# Patient Record
Sex: MALE | Race: OTHER | HISPANIC OR LATINO
[De-identification: names, ages, dates, MRNs, and addresses within clinical notes are randomized per-mention and may not be internally consistent; named-entity substitution may affect disease eponyms.]

---

## 2019-02-08 ENCOUNTER — APPOINTMENT (OUTPATIENT)
Dept: OPHTHALMOLOGY | Facility: CLINIC | Age: 64
End: 2019-02-08
Payer: MEDICAID

## 2019-02-08 PROCEDURE — 92015 DETERMINE REFRACTIVE STATE: CPT

## 2019-02-08 PROCEDURE — 92004 COMPRE OPH EXAM NEW PT 1/>: CPT

## 2019-03-20 ENCOUNTER — APPOINTMENT (OUTPATIENT)
Dept: GASTROENTEROLOGY | Facility: CLINIC | Age: 64
End: 2019-03-20
Payer: MEDICAID

## 2019-03-20 VITALS
BODY MASS INDEX: 27.16 KG/M2 | HEIGHT: 65 IN | TEMPERATURE: 97.9 F | DIASTOLIC BLOOD PRESSURE: 83 MMHG | HEART RATE: 90 BPM | OXYGEN SATURATION: 97 % | WEIGHT: 163 LBS | SYSTOLIC BLOOD PRESSURE: 138 MMHG

## 2019-03-20 PROCEDURE — 99213 OFFICE O/P EST LOW 20 MIN: CPT

## 2019-03-27 ENCOUNTER — APPOINTMENT (OUTPATIENT)
Dept: ULTRASOUND IMAGING | Facility: HOSPITAL | Age: 64
End: 2019-03-27
Payer: MEDICAID

## 2019-03-27 ENCOUNTER — OUTPATIENT (OUTPATIENT)
Dept: OUTPATIENT SERVICES | Facility: HOSPITAL | Age: 64
LOS: 1 days | End: 2019-03-27
Payer: MEDICAID

## 2019-03-27 DIAGNOSIS — K21.9 GASTRO-ESOPHAGEAL REFLUX DISEASE WITHOUT ESOPHAGITIS: ICD-10-CM

## 2019-03-27 PROCEDURE — 76700 US EXAM ABDOM COMPLETE: CPT | Mod: 26

## 2019-03-27 PROCEDURE — 76700 US EXAM ABDOM COMPLETE: CPT

## 2019-05-15 ENCOUNTER — APPOINTMENT (OUTPATIENT)
Dept: OPHTHALMOLOGY | Facility: CLINIC | Age: 64
End: 2019-05-15

## 2020-04-02 ENCOUNTER — INPATIENT (INPATIENT)
Facility: HOSPITAL | Age: 65
LOS: 24 days | Discharge: HOME | DRG: 871 | End: 2020-04-27
Attending: INTERNAL MEDICINE | Admitting: HOSPITALIST
Payer: MEDICAID

## 2020-04-02 VITALS
WEIGHT: 160.06 LBS | DIASTOLIC BLOOD PRESSURE: 110 MMHG | TEMPERATURE: 100 F | SYSTOLIC BLOOD PRESSURE: 183 MMHG | HEIGHT: 65 IN | RESPIRATION RATE: 20 BRPM | OXYGEN SATURATION: 90 % | HEART RATE: 131 BPM

## 2020-04-02 DIAGNOSIS — R68.89 OTHER GENERAL SYMPTOMS AND SIGNS: ICD-10-CM

## 2020-04-02 DIAGNOSIS — Z90.49 ACQUIRED ABSENCE OF OTHER SPECIFIED PARTS OF DIGESTIVE TRACT: Chronic | ICD-10-CM

## 2020-04-02 DIAGNOSIS — Z98.890 OTHER SPECIFIED POSTPROCEDURAL STATES: Chronic | ICD-10-CM

## 2020-04-02 LAB
ALBUMIN SERPL ELPH-MCNC: 3.7 G/DL — SIGNIFICANT CHANGE UP (ref 3.3–5)
ALP SERPL-CCNC: 53 U/L — SIGNIFICANT CHANGE UP (ref 40–120)
ALT FLD-CCNC: 84 U/L — HIGH (ref 10–45)
ANION GAP SERPL CALC-SCNC: 14 MMOL/L — SIGNIFICANT CHANGE UP (ref 5–17)
APTT BLD: 29.4 SEC — SIGNIFICANT CHANGE UP (ref 27.5–36.3)
AST SERPL-CCNC: 84 U/L — HIGH (ref 10–40)
BASOPHILS # BLD AUTO: 0 K/UL — SIGNIFICANT CHANGE UP (ref 0–0.2)
BASOPHILS NFR BLD AUTO: 0 % — SIGNIFICANT CHANGE UP (ref 0–2)
BILIRUB SERPL-MCNC: 0.6 MG/DL — SIGNIFICANT CHANGE UP (ref 0.2–1.2)
BUN SERPL-MCNC: 13 MG/DL — SIGNIFICANT CHANGE UP (ref 7–23)
CALCIUM SERPL-MCNC: 8.8 MG/DL — SIGNIFICANT CHANGE UP (ref 8.4–10.5)
CHLORIDE SERPL-SCNC: 96 MMOL/L — SIGNIFICANT CHANGE UP (ref 96–108)
CO2 SERPL-SCNC: 27 MMOL/L — SIGNIFICANT CHANGE UP (ref 22–31)
CREAT SERPL-MCNC: 0.84 MG/DL — SIGNIFICANT CHANGE UP (ref 0.5–1.3)
CRP SERPL-MCNC: 3.08 MG/DL — HIGH (ref 0–0.4)
D DIMER BLD IA.RAPID-MCNC: 1148 NG/ML DDU — HIGH
EOSINOPHIL # BLD AUTO: 0 K/UL — SIGNIFICANT CHANGE UP (ref 0–0.5)
EOSINOPHIL NFR BLD AUTO: 0 % — SIGNIFICANT CHANGE UP (ref 0–6)
FERRITIN SERPL-MCNC: 2423 NG/ML — HIGH (ref 30–400)
GIANT PLATELETS BLD QL SMEAR: PRESENT — SIGNIFICANT CHANGE UP
GLUCOSE SERPL-MCNC: 208 MG/DL — HIGH (ref 70–99)
HCT VFR BLD CALC: 41.1 % — SIGNIFICANT CHANGE UP (ref 39–50)
HGB BLD-MCNC: 14.1 G/DL — SIGNIFICANT CHANGE UP (ref 13–17)
INR BLD: 1.2 RATIO — HIGH (ref 0.88–1.16)
LACTATE SERPL-SCNC: 1.4 MMOL/L — SIGNIFICANT CHANGE UP (ref 0.7–2)
LDH SERPL L TO P-CCNC: 491 U/L — HIGH (ref 50–242)
LYMPHOCYTES # BLD AUTO: 0.12 K/UL — LOW (ref 1–3.3)
LYMPHOCYTES # BLD AUTO: 2 % — LOW (ref 13–44)
MANUAL SMEAR VERIFICATION: SIGNIFICANT CHANGE UP
MCHC RBC-ENTMCNC: 30.6 PG — SIGNIFICANT CHANGE UP (ref 27–34)
MCHC RBC-ENTMCNC: 34.3 GM/DL — SIGNIFICANT CHANGE UP (ref 32–36)
MCV RBC AUTO: 89.2 FL — SIGNIFICANT CHANGE UP (ref 80–100)
MONOCYTES # BLD AUTO: 0.24 K/UL — SIGNIFICANT CHANGE UP (ref 0–0.9)
MONOCYTES NFR BLD AUTO: 4 % — SIGNIFICANT CHANGE UP (ref 2–14)
NEUTROPHILS # BLD AUTO: 5.39 K/UL — SIGNIFICANT CHANGE UP (ref 1.8–7.4)
NEUTROPHILS NFR BLD AUTO: 84 % — HIGH (ref 43–77)
NEUTS BAND # BLD: 7 % — SIGNIFICANT CHANGE UP (ref 0–8)
NRBC # BLD: 0 /100 — SIGNIFICANT CHANGE UP (ref 0–0)
PLAT MORPH BLD: ABNORMAL
PLATELET # BLD AUTO: 270 K/UL — SIGNIFICANT CHANGE UP (ref 150–400)
POTASSIUM SERPL-MCNC: 3.8 MMOL/L — SIGNIFICANT CHANGE UP (ref 3.5–5.3)
POTASSIUM SERPL-SCNC: 3.8 MMOL/L — SIGNIFICANT CHANGE UP (ref 3.5–5.3)
PROCALCITONIN SERPL-MCNC: 0.11 NG/ML — HIGH (ref 0.02–0.1)
PROT SERPL-MCNC: 7.8 G/DL — SIGNIFICANT CHANGE UP (ref 6–8.3)
PROTHROM AB SERPL-ACNC: 13.9 SEC — HIGH (ref 10–12.9)
RBC # BLD: 4.61 M/UL — SIGNIFICANT CHANGE UP (ref 4.2–5.8)
RBC # FLD: 11.9 % — SIGNIFICANT CHANGE UP (ref 10.3–14.5)
RBC BLD AUTO: NORMAL — SIGNIFICANT CHANGE UP
SODIUM SERPL-SCNC: 137 MMOL/L — SIGNIFICANT CHANGE UP (ref 135–145)
TROPONIN T, HIGH SENSITIVITY RESULT: 7 NG/L — SIGNIFICANT CHANGE UP (ref 0–51)
TROPONIN T, HIGH SENSITIVITY RESULT: 8 NG/L — SIGNIFICANT CHANGE UP (ref 0–51)
VARIANT LYMPHS # BLD: 3 % — SIGNIFICANT CHANGE UP (ref 0–6)
WBC # BLD: 5.92 K/UL — SIGNIFICANT CHANGE UP (ref 3.8–10.5)
WBC # FLD AUTO: 5.92 K/UL — SIGNIFICANT CHANGE UP (ref 3.8–10.5)

## 2020-04-02 PROCEDURE — 93010 ELECTROCARDIOGRAM REPORT: CPT

## 2020-04-02 PROCEDURE — 99285 EMERGENCY DEPT VISIT HI MDM: CPT

## 2020-04-02 PROCEDURE — 71045 X-RAY EXAM CHEST 1 VIEW: CPT | Mod: 26

## 2020-04-02 RX ORDER — HYDROXYCHLOROQUINE SULFATE 200 MG
200 TABLET ORAL EVERY 12 HOURS
Refills: 0 | Status: COMPLETED | OUTPATIENT
Start: 2020-04-03 | End: 2020-04-07

## 2020-04-02 RX ORDER — ACETAMINOPHEN 500 MG
650 TABLET ORAL EVERY 4 HOURS
Refills: 0 | Status: DISCONTINUED | OUTPATIENT
Start: 2020-04-02 | End: 2020-04-27

## 2020-04-02 RX ORDER — ACETAMINOPHEN 500 MG
975 TABLET ORAL ONCE
Refills: 0 | Status: COMPLETED | OUTPATIENT
Start: 2020-04-02 | End: 2020-04-02

## 2020-04-02 RX ORDER — ENOXAPARIN SODIUM 100 MG/ML
40 INJECTION SUBCUTANEOUS DAILY
Refills: 0 | Status: DISCONTINUED | OUTPATIENT
Start: 2020-04-02 | End: 2020-04-10

## 2020-04-02 RX ORDER — HYDROXYCHLOROQUINE SULFATE 200 MG
TABLET ORAL
Refills: 0 | Status: COMPLETED | OUTPATIENT
Start: 2020-04-02 | End: 2020-04-07

## 2020-04-02 RX ORDER — HYDROXYCHLOROQUINE SULFATE 200 MG
400 TABLET ORAL EVERY 12 HOURS
Refills: 0 | Status: COMPLETED | OUTPATIENT
Start: 2020-04-02 | End: 2020-04-03

## 2020-04-02 RX ORDER — CARVEDILOL PHOSPHATE 80 MG/1
25 CAPSULE, EXTENDED RELEASE ORAL EVERY 12 HOURS
Refills: 0 | Status: DISCONTINUED | OUTPATIENT
Start: 2020-04-02 | End: 2020-04-08

## 2020-04-02 RX ADMIN — CARVEDILOL PHOSPHATE 25 MILLIGRAM(S): 80 CAPSULE, EXTENDED RELEASE ORAL at 19:13

## 2020-04-02 RX ADMIN — Medication 975 MILLIGRAM(S): at 14:03

## 2020-04-02 RX ADMIN — Medication 400 MILLIGRAM(S): at 18:22

## 2020-04-02 NOTE — ED ADULT NURSE REASSESSMENT NOTE - NS ED NURSE REASSESS COMMENT FT1
Report given to 3 KWN nurse by ED RN Soledad. Pt transported to admitted bed on 2L O2 nasal cannula.

## 2020-04-02 NOTE — ED ADULT NURSE REASSESSMENT NOTE - NS ED NURSE REASSESS COMMENT FT1
Medications administered, pt resting comfortably in stretcher. Call bell at bedside, pending admission bed.

## 2020-04-02 NOTE — ED ADULT NURSE NOTE - NSIMPLEMENTINTERV_GEN_ALL_ED
Implemented All Universal Safety Interventions:  [place] to call system. Call bell, personal items and telephone within reach. Instruct patient to call for assistance. Room bathroom lighting operational. Non-slip footwear when patient is off stretcher. Physically safe environment: no spills, clutter or unnecessary equipment. Stretcher in lowest position, wheels locked, appropriate side rails in place.

## 2020-04-02 NOTE — ED ADULT NURSE REASSESSMENT NOTE - NS ED NURSE REASSESS COMMENT FT1
Pt resting comfortably in stretcher, 2L O2 nasal cannula. Pending troponin result, admitted to medicine for possible COVID. Pt aware of plan of care.

## 2020-04-02 NOTE — ED PROVIDER NOTE - ATTENDING CONTRIBUTION TO CARE
64M, pmh htn, pre-DM, presents with gen weakness, headache, fever, cough, sob, gen abd discomfort x 10 days. +Anterior chest and upper back pain, worse with inspiration. Did return from [place] 3/10. No calf pain/swelling. No hx of PE or DVT.    PE: NAD, NCAT, MMM, Trachea midline, Normal conjunctiva, lungs CTAB, S1/S2 RRR, Normal perfusion, 2+ radial pulses bilat, Abdomen Soft, NTND, No rebound/guarding, No LE edema, No deformity of extremities, No rashes,  No focal motor or sensory deficits.     Pt hypoxic with ambulation, improves with supplemental O2. Sx most likely 2/2 covid-19 infection. Obtain labs, cxr, ekg. Admit. - Casey Muñoz MD

## 2020-04-02 NOTE — H&P ADULT - HISTORY OF PRESENT ILLNESS
63 yo M with HTN presents with fevers and myalgias. His symptoms started about 10 days ago with fevers to 103 daily. He also developed myalgias. He had no cough but over the last 2 days developed SOB with exertion. He also notes diarrhea for the last few days. Denies CP, palp, dizziness, syncope.

## 2020-04-02 NOTE — ED ADULT NURSE NOTE - OBJECTIVE STATEMENT
64 year old male c/o difficulty breathing. PMH includes prediabetes and HTN. Pt A+Ox3 reports 7/10 intermittent headache, dizziness, SOB, chest pain which worsens with inspiration and exertion, nausea, diffuse abdominal tenderness and diarrhea. Pt reports taking Tamiflu and Tylenol with minor relief, last dose last night. Pt denies vomiting, urinary symptoms. 64 year old male c/o difficulty breathing. PMH includes prediabetes and HTN. Pt A+Ox3 reports 7/10 intermittent headache, dizziness, SOB, chest pain which worsens with inspiration and exertion, nausea, diffuse abdominal tenderness and diarrhea x10 days. Pt reports taking Tamiflu and Tylenol with minor relief, last dose last night. Pt denies vomiting, urinary symptoms, recent travel, sick contacts.

## 2020-04-02 NOTE — H&P ADULT - ASSESSMENT
63 yo M with HTN presents with fevers and myalgias, found to be hypoxic, concerning for COVID-19 infection.    Acute respiratory failure 2/2 suspected COVID-19  - 92% O2 sat at rest, drops to 88% on exertion; now on 2L NC, satting well  - CXR w/o obvious infiltrates  - obtain baseline CPK, trop, ferritin, CRP, LDH, d-dimer, PT/INR, PTT, procalcitonin  - trend daily CBC, CMP  - trend q72 procal, CRP, ferritin  - tylenol prn for fever  - f/u nasopharyngeal COVID-19 PCR  - start Plaquenil (QTc 435); monitor LFTs daily, if AST/ALT> 3xULN, Tbili>1.5, would d/c  - supportive care, wean off O2 as tolerated    HTN  - elevated  - start coreg 25mg BID, hold for HR<60, SBP<100      DVT: lovenox ppx  Full Code      Prasad Ratliff MD  Admitting Hospitalist  [phone]

## 2020-04-02 NOTE — ED PROVIDER NOTE - PROGRESS NOTE DETAILS
Pt comfortable on 2L O2 via NC with sats of 95-97%. desats to 88% off oxygen with ambulation. will admit pt for further management. -Vilma Carroll PA-C

## 2020-04-03 LAB
ALBUMIN SERPL ELPH-MCNC: 3.4 G/DL — SIGNIFICANT CHANGE UP (ref 3.3–5)
ALP SERPL-CCNC: 53 U/L — SIGNIFICANT CHANGE UP (ref 40–120)
ALT FLD-CCNC: 86 U/L — HIGH (ref 10–45)
ANION GAP SERPL CALC-SCNC: 13 MMOL/L — SIGNIFICANT CHANGE UP (ref 5–17)
AST SERPL-CCNC: 70 U/L — HIGH (ref 10–40)
BILIRUB SERPL-MCNC: 0.6 MG/DL — SIGNIFICANT CHANGE UP (ref 0.2–1.2)
BUN SERPL-MCNC: 15 MG/DL — SIGNIFICANT CHANGE UP (ref 7–23)
CALCIUM SERPL-MCNC: 8.7 MG/DL — SIGNIFICANT CHANGE UP (ref 8.4–10.5)
CHLORIDE SERPL-SCNC: 98 MMOL/L — SIGNIFICANT CHANGE UP (ref 96–108)
CK SERPL-CCNC: 138 U/L — SIGNIFICANT CHANGE UP (ref 30–200)
CO2 SERPL-SCNC: 23 MMOL/L — SIGNIFICANT CHANGE UP (ref 22–31)
CREAT SERPL-MCNC: 0.82 MG/DL — SIGNIFICANT CHANGE UP (ref 0.5–1.3)
GLUCOSE SERPL-MCNC: 161 MG/DL — HIGH (ref 70–99)
HCT VFR BLD CALC: 38.1 % — LOW (ref 39–50)
HCV AB S/CO SERPL IA: 0.13 S/CO — SIGNIFICANT CHANGE UP (ref 0–0.99)
HCV AB SERPL-IMP: SIGNIFICANT CHANGE UP
HGB BLD-MCNC: 13.7 G/DL — SIGNIFICANT CHANGE UP (ref 13–17)
MCHC RBC-ENTMCNC: 32.2 PG — SIGNIFICANT CHANGE UP (ref 27–34)
MCHC RBC-ENTMCNC: 36 GM/DL — SIGNIFICANT CHANGE UP (ref 32–36)
MCV RBC AUTO: 89.4 FL — SIGNIFICANT CHANGE UP (ref 80–100)
NRBC # BLD: 0 /100 WBCS — SIGNIFICANT CHANGE UP (ref 0–0)
PLATELET # BLD AUTO: 232 K/UL — SIGNIFICANT CHANGE UP (ref 150–400)
POTASSIUM SERPL-MCNC: 3.9 MMOL/L — SIGNIFICANT CHANGE UP (ref 3.5–5.3)
POTASSIUM SERPL-SCNC: 3.9 MMOL/L — SIGNIFICANT CHANGE UP (ref 3.5–5.3)
PROT SERPL-MCNC: 7.5 G/DL — SIGNIFICANT CHANGE UP (ref 6–8.3)
RBC # BLD: 4.26 M/UL — SIGNIFICANT CHANGE UP (ref 4.2–5.8)
RBC # FLD: 12.2 % — SIGNIFICANT CHANGE UP (ref 10.3–14.5)
SARS-COV-2 RNA SPEC QL NAA+PROBE: DETECTED
SODIUM SERPL-SCNC: 134 MMOL/L — LOW (ref 135–145)
WBC # BLD: 7.05 K/UL — SIGNIFICANT CHANGE UP (ref 3.8–10.5)
WBC # FLD AUTO: 7.05 K/UL — SIGNIFICANT CHANGE UP (ref 3.8–10.5)

## 2020-04-03 PROCEDURE — 99232 SBSQ HOSP IP/OBS MODERATE 35: CPT

## 2020-04-03 RX ORDER — BENZOCAINE AND MENTHOL 5; 1 G/100ML; G/100ML
1 LIQUID ORAL EVERY 4 HOURS
Refills: 0 | Status: COMPLETED | OUTPATIENT
Start: 2020-04-03 | End: 2020-04-06

## 2020-04-03 RX ADMIN — BENZOCAINE AND MENTHOL 1 LOZENGE: 5; 1 LIQUID ORAL at 18:16

## 2020-04-03 RX ADMIN — Medication 400 MILLIGRAM(S): at 06:12

## 2020-04-03 RX ADMIN — Medication 650 MILLIGRAM(S): at 06:12

## 2020-04-03 RX ADMIN — CARVEDILOL PHOSPHATE 25 MILLIGRAM(S): 80 CAPSULE, EXTENDED RELEASE ORAL at 06:12

## 2020-04-03 RX ADMIN — BENZOCAINE AND MENTHOL 1 LOZENGE: 5; 1 LIQUID ORAL at 02:45

## 2020-04-03 RX ADMIN — CARVEDILOL PHOSPHATE 25 MILLIGRAM(S): 80 CAPSULE, EXTENDED RELEASE ORAL at 18:16

## 2020-04-03 RX ADMIN — BENZOCAINE AND MENTHOL 1 LOZENGE: 5; 1 LIQUID ORAL at 14:45

## 2020-04-03 RX ADMIN — ENOXAPARIN SODIUM 40 MILLIGRAM(S): 100 INJECTION SUBCUTANEOUS at 14:45

## 2020-04-03 RX ADMIN — Medication 200 MILLIGRAM(S): at 18:16

## 2020-04-03 NOTE — PROGRESS NOTE ADULT - SUBJECTIVE AND OBJECTIVE BOX
[facility] Division of Hospital Medicine Progress Note    Patient is a 64y old  Male who presents with a chief complaint of hypoxia (02 Apr 2020 17:48)      SUBJECTIVE / OVERNIGHT EVENTS:    ADDITIONAL REVIEW OF SYSTEMS:    MEDICATIONS  (STANDING):  carvedilol 25 milliGRAM(s) Oral every 12 hours  enoxaparin Injectable 40 milliGRAM(s) SubCutaneous daily  hydroxychloroquine 200 milliGRAM(s) Oral every 12 hours  hydroxychloroquine   Oral     MEDICATIONS  (PRN):  acetaminophen   Tablet .. 650 milliGRAM(s) Oral every 4 hours PRN Temp greater or equal to 38.5C (101.3F)  benzocaine 15 mG/menthol 3.6 mG (Sugar-Free) Lozenge 1 Lozenge Oral every 4 hours PRN Sore Throat      CAPILLARY BLOOD GLUCOSE        I&O's Summary      PHYSICAL EXAM:  Vital Signs Last 24 Hrs  T(C): 37.1 (03 Apr 2020 14:04), Max: 38.7 (03 Apr 2020 05:18)  T(F): 98.7 (03 Apr 2020 14:04), Max: 101.7 (03 Apr 2020 05:18)  HR: 83 (03 Apr 2020 14:04) (83 - 96)  BP: 143/85 (03 Apr 2020 14:04) (119/77 - 169/94)  BP(mean): 118 (02 Apr 2020 17:48) (118 - 118)  RR: 20 (03 Apr 2020 14:04) (18 - 20)  SpO2: 94% (03 Apr 2020 14:04) (91% - 97%)  CONSTITUTIONAL: NAD, well-developed, well-groomed  ENMT: Moist oral mucosa, no pharyngeal injection or exudates; normal dentition  RESPIRATORY: Normal respiratory effort; lungs are clear to auscultation bilaterally  CARDIOVASCULAR: Regular rate and rhythm, normal S1 and S2, no murmur/rub/gallop; No lower extremity edema; Peripheral pulses are 2+ bilaterally  ABDOMEN: Nontender to palpation, normoactive bowel sounds, no rebound/guarding; No hepatosplenomegaly  PSYCH: A+O to person, place, and time; affect appropriate  NEUROLOGY: CN 2-12 are intact and symmetric; no gross sensory deficits   SKIN: No rashes; no palpable lesions    LABS:                        13.7   7.05  )-----------( 232      ( 03 Apr 2020 07:53 )             38.1     04-03    134<L>  |  98  |  15  ----------------------------<  161<H>  3.9   |  23  |  0.82    Ca    8.7      03 Apr 2020 07:53    TPro  7.5  /  Alb  3.4  /  TBili  0.6  /  DBili  x   /  AST  70<H>  /  ALT  86<H>  /  AlkPhos  53  04-03    PT/INR - ( 02 Apr 2020 18:41 )   PT: 13.9 sec;   INR: 1.20 ratio         PTT - ( 02 Apr 2020 18:41 )  PTT:29.4 sec  CARDIAC MARKERS ( 03 Apr 2020 07:53 )  x     / x     / 138 U/L / x     / x              COVID-19 PCR: Detected (04-02-20 @ 13:58)      RADIOLOGY & ADDITIONAL TESTS:  Imaging from Last 24 Hours:  Electrocardiogram/QTc Interval:    COORDINATION OF CARE:  Care Discussed with Consultants/Other Providers:

## 2020-04-03 NOTE — PROGRESS NOTE ADULT - ASSESSMENT
65 yo HTN, IFG, COVID +  O2 desat w exertion 92%-->88%    Plaquanil Day 2  QTc 435  today 91% WHILE FEBRILE  Tmax 38.7  Fever 2/2 COVID 19++  BP responded to COREG    Procalcitonin minimal 0.11  Hep C NEG

## 2020-04-04 LAB
ALBUMIN SERPL ELPH-MCNC: 3.5 G/DL — SIGNIFICANT CHANGE UP (ref 3.3–5)
ALP SERPL-CCNC: 53 U/L — SIGNIFICANT CHANGE UP (ref 40–120)
ALT FLD-CCNC: 66 U/L — HIGH (ref 10–45)
ANION GAP SERPL CALC-SCNC: 16 MMOL/L — SIGNIFICANT CHANGE UP (ref 5–17)
AST SERPL-CCNC: 49 U/L — HIGH (ref 10–40)
BILIRUB SERPL-MCNC: 0.7 MG/DL — SIGNIFICANT CHANGE UP (ref 0.2–1.2)
BUN SERPL-MCNC: 14 MG/DL — SIGNIFICANT CHANGE UP (ref 7–23)
CALCIUM SERPL-MCNC: 8.8 MG/DL — SIGNIFICANT CHANGE UP (ref 8.4–10.5)
CHLORIDE SERPL-SCNC: 98 MMOL/L — SIGNIFICANT CHANGE UP (ref 96–108)
CO2 SERPL-SCNC: 23 MMOL/L — SIGNIFICANT CHANGE UP (ref 22–31)
CREAT SERPL-MCNC: 0.81 MG/DL — SIGNIFICANT CHANGE UP (ref 0.5–1.3)
GLUCOSE SERPL-MCNC: 170 MG/DL — HIGH (ref 70–99)
HCT VFR BLD CALC: 36.5 % — LOW (ref 39–50)
HGB BLD-MCNC: 13.1 G/DL — SIGNIFICANT CHANGE UP (ref 13–17)
MCHC RBC-ENTMCNC: 32 PG — SIGNIFICANT CHANGE UP (ref 27–34)
MCHC RBC-ENTMCNC: 35.9 GM/DL — SIGNIFICANT CHANGE UP (ref 32–36)
MCV RBC AUTO: 89.2 FL — SIGNIFICANT CHANGE UP (ref 80–100)
NRBC # BLD: 0 /100 WBCS — SIGNIFICANT CHANGE UP (ref 0–0)
PLATELET # BLD AUTO: 257 K/UL — SIGNIFICANT CHANGE UP (ref 150–400)
POTASSIUM SERPL-MCNC: 3.8 MMOL/L — SIGNIFICANT CHANGE UP (ref 3.5–5.3)
POTASSIUM SERPL-SCNC: 3.8 MMOL/L — SIGNIFICANT CHANGE UP (ref 3.5–5.3)
PROT SERPL-MCNC: 7.4 G/DL — SIGNIFICANT CHANGE UP (ref 6–8.3)
RBC # BLD: 4.09 M/UL — LOW (ref 4.2–5.8)
RBC # FLD: 12 % — SIGNIFICANT CHANGE UP (ref 10.3–14.5)
SODIUM SERPL-SCNC: 137 MMOL/L — SIGNIFICANT CHANGE UP (ref 135–145)
WBC # BLD: 7.69 K/UL — SIGNIFICANT CHANGE UP (ref 3.8–10.5)
WBC # FLD AUTO: 7.69 K/UL — SIGNIFICANT CHANGE UP (ref 3.8–10.5)

## 2020-04-04 PROCEDURE — 93010 ELECTROCARDIOGRAM REPORT: CPT

## 2020-04-04 PROCEDURE — 99232 SBSQ HOSP IP/OBS MODERATE 35: CPT

## 2020-04-04 RX ADMIN — Medication 200 MILLIGRAM(S): at 06:08

## 2020-04-04 RX ADMIN — CARVEDILOL PHOSPHATE 25 MILLIGRAM(S): 80 CAPSULE, EXTENDED RELEASE ORAL at 18:08

## 2020-04-04 RX ADMIN — Medication 200 MILLIGRAM(S): at 18:08

## 2020-04-04 RX ADMIN — CARVEDILOL PHOSPHATE 25 MILLIGRAM(S): 80 CAPSULE, EXTENDED RELEASE ORAL at 06:08

## 2020-04-04 RX ADMIN — BENZOCAINE AND MENTHOL 1 LOZENGE: 5; 1 LIQUID ORAL at 02:50

## 2020-04-04 RX ADMIN — Medication 650 MILLIGRAM(S): at 21:32

## 2020-04-04 RX ADMIN — Medication 650 MILLIGRAM(S): at 06:15

## 2020-04-04 RX ADMIN — ENOXAPARIN SODIUM 40 MILLIGRAM(S): 100 INJECTION SUBCUTANEOUS at 12:55

## 2020-04-04 NOTE — PROGRESS NOTE ADULT - SUBJECTIVE AND OBJECTIVE BOX
[facility] Division of Hospital Medicine Progress Note    Patient is a 64y old  Male who presents with a chief complaint of hypoxia (03 Apr 2020 15:10)      SUBJECTIVE / OVERNIGHT EVENTS:  Patient febrile to 101.6F overnight, still feeling ill. Still having SOB - 93% on 4L NC currently.  ADDITIONAL REVIEW OF SYSTEMS:    MEDICATIONS  (STANDING):  carvedilol 25 milliGRAM(s) Oral every 12 hours  enoxaparin Injectable 40 milliGRAM(s) SubCutaneous daily  hydroxychloroquine 200 milliGRAM(s) Oral every 12 hours  hydroxychloroquine   Oral     MEDICATIONS  (PRN):  acetaminophen   Tablet .. 650 milliGRAM(s) Oral every 4 hours PRN Temp greater or equal to 38.5C (101.3F)  benzocaine 15 mG/menthol 3.6 mG (Sugar-Free) Lozenge 1 Lozenge Oral every 4 hours PRN Sore Throat      CAPILLARY BLOOD GLUCOSE        I&O's Summary    04 Apr 2020 07:01  -  04 Apr 2020 13:05  --------------------------------------------------------  IN: 240 mL / OUT: 0 mL / NET: 240 mL        PHYSICAL EXAM:  Vital Signs Last 24 Hrs  T(C): 37.2 (04 Apr 2020 08:19), Max: 38.7 (04 Apr 2020 06:48)  T(F): 99 (04 Apr 2020 08:19), Max: 101.6 (04 Apr 2020 06:48)  HR: 83 (04 Apr 2020 08:19) (75 - 94)  BP: 130/78 (04 Apr 2020 08:19) (130/78 - 167/98)  BP(mean): --  RR: 20 (04 Apr 2020 08:19) (20 - 20)  SpO2: 93% (04 Apr 2020 08:19) (93% - 95%)    CONSTITUTIONAL: NAD, well-developed  ENMT: Moist oral mucosa, no pharyngeal injection or exudates; normal dentition  RESPIRATORY: Normal respiratory effort; mildly decreased BS BL  CARDIOVASCULAR: Regular rate and rhythm, normal S1 and S2, no murmur/rub/gallop; No lower extremity edema; Peripheral pulses are 2+ bilaterally  ABDOMEN: Nontender to palpation, normoactive bowel sounds, no rebound/guarding; No hepatosplenomegaly  PSYCH: affect appropriate  NEUROLOGY: CN 2-12 are intact and symmetric; no gross sensory deficits   SKIN: No rashes; no palpable lesions    LABS:                        13.1   7.69  )-----------( 257      ( 04 Apr 2020 07:33 )             36.5     04-04    137  |  98  |  14  ----------------------------<  170<H>  3.8   |  23  |  0.81    Ca    8.8      04 Apr 2020 07:33    TPro  7.4  /  Alb  3.5  /  TBili  0.7  /  DBili  x   /  AST  49<H>  /  ALT  66<H>  /  AlkPhos  53  04-04    PT/INR - ( 02 Apr 2020 18:41 )   PT: 13.9 sec;   INR: 1.20 ratio         PTT - ( 02 Apr 2020 18:41 )  PTT:29.4 sec  CARDIAC MARKERS ( 03 Apr 2020 07:53 )  x     / x     / 138 U/L / x     / x              COVID-19 PCR: Detected (04-02-20 @ 13:58)      RADIOLOGY & ADDITIONAL TESTS:  Imaging from Last 24 Hours:  Electrocardiogram/QTc Interval:    COORDINATION OF CARE:  Care Discussed with Consultants/Other Providers:

## 2020-04-04 NOTE — PROGRESS NOTE ADULT - ASSESSMENT
63 yo M with HTN presents with fevers and myalgias, found to be hypoxic, positive for COVID-19 infection.    Acute respiratory failure 2/2 COVID-19  - O2 sat currently 93% on 4L NC  - CXR w/o obvious infiltrates  - trend daily CBC, CMP  - trend q72 procal, CRP, ferritin  - tylenol prn for fever  - continue Plaquenil (Biotel for Qtc monitoring); monitor LFTs daily, if AST/ALT> 3xULN, Tbili>1.5, would d/c  - supportive care, wean off O2 as tolerated    HTN  - improved - continue coreg 25mg BID, hold for HR<60, SBP<100      DVT: lovenox ppx  Full Code

## 2020-04-04 NOTE — CHART NOTE - NSCHARTNOTEFT_GEN_A_CORE
Biotel Monitoring Note:    COVID Patient on hydroxychloroquine    Biotel Reading on: 4/3/20  QTc measurement: 432ms    -->Qtc<500ms, therefore no additional indication for monitoring. D/C Biotel.

## 2020-04-05 LAB
ALBUMIN SERPL ELPH-MCNC: 3.4 G/DL — SIGNIFICANT CHANGE UP (ref 3.3–5)
ALP SERPL-CCNC: 50 U/L — SIGNIFICANT CHANGE UP (ref 40–120)
ALT FLD-CCNC: 50 U/L — HIGH (ref 10–45)
ANION GAP SERPL CALC-SCNC: 11 MMOL/L — SIGNIFICANT CHANGE UP (ref 5–17)
AST SERPL-CCNC: 35 U/L — SIGNIFICANT CHANGE UP (ref 10–40)
BILIRUB SERPL-MCNC: 0.7 MG/DL — SIGNIFICANT CHANGE UP (ref 0.2–1.2)
BUN SERPL-MCNC: 14 MG/DL — SIGNIFICANT CHANGE UP (ref 7–23)
CALCIUM SERPL-MCNC: 8.9 MG/DL — SIGNIFICANT CHANGE UP (ref 8.4–10.5)
CHLORIDE SERPL-SCNC: 99 MMOL/L — SIGNIFICANT CHANGE UP (ref 96–108)
CO2 SERPL-SCNC: 27 MMOL/L — SIGNIFICANT CHANGE UP (ref 22–31)
CREAT SERPL-MCNC: 0.78 MG/DL — SIGNIFICANT CHANGE UP (ref 0.5–1.3)
CRP SERPL-MCNC: 10.03 MG/DL — HIGH (ref 0–0.4)
FERRITIN SERPL-MCNC: 1596 NG/ML — HIGH (ref 30–400)
GLUCOSE SERPL-MCNC: 156 MG/DL — HIGH (ref 70–99)
HCT VFR BLD CALC: 36.6 % — LOW (ref 39–50)
HGB BLD-MCNC: 12.3 G/DL — LOW (ref 13–17)
MCHC RBC-ENTMCNC: 29.9 PG — SIGNIFICANT CHANGE UP (ref 27–34)
MCHC RBC-ENTMCNC: 33.6 GM/DL — SIGNIFICANT CHANGE UP (ref 32–36)
MCV RBC AUTO: 88.8 FL — SIGNIFICANT CHANGE UP (ref 80–100)
NRBC # BLD: 0 /100 WBCS — SIGNIFICANT CHANGE UP (ref 0–0)
PLATELET # BLD AUTO: 303 K/UL — SIGNIFICANT CHANGE UP (ref 150–400)
POTASSIUM SERPL-MCNC: 3.9 MMOL/L — SIGNIFICANT CHANGE UP (ref 3.5–5.3)
POTASSIUM SERPL-SCNC: 3.9 MMOL/L — SIGNIFICANT CHANGE UP (ref 3.5–5.3)
PROCALCITONIN SERPL-MCNC: 0.19 NG/ML — HIGH (ref 0.02–0.1)
PROT SERPL-MCNC: 7.1 G/DL — SIGNIFICANT CHANGE UP (ref 6–8.3)
RBC # BLD: 4.12 M/UL — LOW (ref 4.2–5.8)
RBC # FLD: 11.8 % — SIGNIFICANT CHANGE UP (ref 10.3–14.5)
SODIUM SERPL-SCNC: 137 MMOL/L — SIGNIFICANT CHANGE UP (ref 135–145)
WBC # BLD: 8.29 K/UL — SIGNIFICANT CHANGE UP (ref 3.8–10.5)
WBC # FLD AUTO: 8.29 K/UL — SIGNIFICANT CHANGE UP (ref 3.8–10.5)

## 2020-04-05 PROCEDURE — 99232 SBSQ HOSP IP/OBS MODERATE 35: CPT

## 2020-04-05 RX ADMIN — CARVEDILOL PHOSPHATE 25 MILLIGRAM(S): 80 CAPSULE, EXTENDED RELEASE ORAL at 18:19

## 2020-04-05 RX ADMIN — Medication 200 MILLIGRAM(S): at 04:30

## 2020-04-05 RX ADMIN — Medication 650 MILLIGRAM(S): at 21:17

## 2020-04-05 RX ADMIN — Medication 650 MILLIGRAM(S): at 12:46

## 2020-04-05 RX ADMIN — ENOXAPARIN SODIUM 40 MILLIGRAM(S): 100 INJECTION SUBCUTANEOUS at 12:42

## 2020-04-05 RX ADMIN — BENZOCAINE AND MENTHOL 1 LOZENGE: 5; 1 LIQUID ORAL at 18:27

## 2020-04-05 RX ADMIN — Medication 200 MILLIGRAM(S): at 18:19

## 2020-04-05 RX ADMIN — CARVEDILOL PHOSPHATE 25 MILLIGRAM(S): 80 CAPSULE, EXTENDED RELEASE ORAL at 04:30

## 2020-04-05 NOTE — PROGRESS NOTE ADULT - ASSESSMENT
63 yo M with HTN presents with fevers and myalgias, found to be hypoxic, positive for COVID-19 infection.    Acute respiratory failure 2/2 COVID-19  - CXR w/o obvious infiltrates  - trend daily CBC, CMP  - trend q72 procal, CRP, ferritin  - tylenol prn for fever  - continue Plaquenil (Biotel for Qtc monitoring); monitor LFTs daily, if AST/ALT> 3xULN, Tbili>1.5, would d/c  - supportive care, wean off O2 as tolerated    HTN  - improved - continue coreg 25mg BID, hold for HR<60, SBP<100      DVT: lovenox ppx  Full Code

## 2020-04-05 NOTE — PROGRESS NOTE ADULT - SUBJECTIVE AND OBJECTIVE BOX
[facility] Division of Hospital Medicine Progress Note    Patient is a 64y old  Male who presents with a chief complaint of hypoxia (04 Apr 2020 13:05)      SUBJECTIVE / OVERNIGHT EVENTS:  Continues to have fevers, patient still has SOB with movement, +sore throat  ADDITIONAL REVIEW OF SYSTEMS:  Denies abdominal pain    MEDICATIONS  (STANDING):  carvedilol 25 milliGRAM(s) Oral every 12 hours  enoxaparin Injectable 40 milliGRAM(s) SubCutaneous daily  hydroxychloroquine 200 milliGRAM(s) Oral every 12 hours  hydroxychloroquine   Oral     MEDICATIONS  (PRN):  acetaminophen   Tablet .. 650 milliGRAM(s) Oral every 4 hours PRN Temp greater or equal to 38.5C (101.3F)  benzocaine 15 mG/menthol 3.6 mG (Sugar-Free) Lozenge 1 Lozenge Oral every 4 hours PRN Sore Throat      CAPILLARY BLOOD GLUCOSE        I&O's Summary    04 Apr 2020 07:01  -  05 Apr 2020 07:00  --------------------------------------------------------  IN: 240 mL / OUT: 0 mL / NET: 240 mL        PHYSICAL EXAM:  Vital Signs Last 24 Hrs  T(C): 38.9 (05 Apr 2020 12:43), Max: 38.9 (05 Apr 2020 12:43)  T(F): 102.1 (05 Apr 2020 12:43), Max: 102.1 (05 Apr 2020 12:43)  HR: 100 (05 Apr 2020 12:43) (93 - 100)  BP: 134/82 (05 Apr 2020 12:43) (134/82 - 164/93)  BP(mean): --  RR: 20 (05 Apr 2020 12:43) (20 - 20)  SpO2: 91% (05 Apr 2020 12:43) (91% - 96%)    CONSTITUTIONAL: NAD, well-developed, well-groomed  ENMT: Moist oral mucosa, no pharyngeal injection or exudates; normal dentition  RESPIRATORY: Normal respiratory effort, mild decreased BS BL  CARDIOVASCULAR: Regular rate and rhythm, normal S1 and S2, no murmur/rub/gallop; No lower extremity edema; Peripheral pulses are 2+ bilaterally  ABDOMEN: Nontender to palpation, normoactive bowel sounds, no rebound/guarding; No hepatosplenomegaly  PSYCH: A+O to person, place, and time; affect appropriate  NEUROLOGY: CN 2-12 are intact and symmetric; no gross sensory deficits   SKIN: No rashes; no palpable lesions    LABS:                        12.3   8.29  )-----------( 303      ( 05 Apr 2020 07:06 )             36.6     04-05    137  |  99  |  14  ----------------------------<  156<H>  3.9   |  27  |  0.78    Ca    8.9      05 Apr 2020 07:04    TPro  7.1  /  Alb  3.4  /  TBili  0.7  /  DBili  x   /  AST  35  /  ALT  50<H>  /  AlkPhos  50  04-05              COVID-19 PCR: Detected (04-02-20 @ 13:58)      RADIOLOGY & ADDITIONAL TESTS:  Imaging from Last 24 Hours:  Electrocardiogram/QTc Interval:    COORDINATION OF CARE:  Care Discussed with Consultants/Other Providers:

## 2020-04-06 LAB
ALBUMIN SERPL ELPH-MCNC: 2.9 G/DL — LOW (ref 3.3–5)
ALP SERPL-CCNC: 52 U/L — SIGNIFICANT CHANGE UP (ref 40–120)
ALT FLD-CCNC: 45 U/L — SIGNIFICANT CHANGE UP (ref 10–45)
ANION GAP SERPL CALC-SCNC: 12 MMOL/L — SIGNIFICANT CHANGE UP (ref 5–17)
AST SERPL-CCNC: 31 U/L — SIGNIFICANT CHANGE UP (ref 10–40)
BILIRUB SERPL-MCNC: 0.6 MG/DL — SIGNIFICANT CHANGE UP (ref 0.2–1.2)
BUN SERPL-MCNC: 14 MG/DL — SIGNIFICANT CHANGE UP (ref 7–23)
CALCIUM SERPL-MCNC: 8.7 MG/DL — SIGNIFICANT CHANGE UP (ref 8.4–10.5)
CHLORIDE SERPL-SCNC: 96 MMOL/L — SIGNIFICANT CHANGE UP (ref 96–108)
CO2 SERPL-SCNC: 25 MMOL/L — SIGNIFICANT CHANGE UP (ref 22–31)
CREAT SERPL-MCNC: 0.76 MG/DL — SIGNIFICANT CHANGE UP (ref 0.5–1.3)
GLUCOSE SERPL-MCNC: 181 MG/DL — HIGH (ref 70–99)
HCT VFR BLD CALC: 34.5 % — LOW (ref 39–50)
HGB BLD-MCNC: 11.9 G/DL — LOW (ref 13–17)
MCHC RBC-ENTMCNC: 30.7 PG — SIGNIFICANT CHANGE UP (ref 27–34)
MCHC RBC-ENTMCNC: 34.5 GM/DL — SIGNIFICANT CHANGE UP (ref 32–36)
MCV RBC AUTO: 88.9 FL — SIGNIFICANT CHANGE UP (ref 80–100)
PLATELET # BLD AUTO: 298 K/UL — SIGNIFICANT CHANGE UP (ref 150–400)
POTASSIUM SERPL-MCNC: 3.8 MMOL/L — SIGNIFICANT CHANGE UP (ref 3.5–5.3)
POTASSIUM SERPL-SCNC: 3.8 MMOL/L — SIGNIFICANT CHANGE UP (ref 3.5–5.3)
PROT SERPL-MCNC: 6.8 G/DL — SIGNIFICANT CHANGE UP (ref 6–8.3)
RBC # BLD: 3.88 M/UL — LOW (ref 4.2–5.8)
RBC # FLD: 11.9 % — SIGNIFICANT CHANGE UP (ref 10.3–14.5)
SODIUM SERPL-SCNC: 133 MMOL/L — LOW (ref 135–145)
WBC # BLD: 8.55 K/UL — SIGNIFICANT CHANGE UP (ref 3.8–10.5)
WBC # FLD AUTO: 8.55 K/UL — SIGNIFICANT CHANGE UP (ref 3.8–10.5)

## 2020-04-06 PROCEDURE — 99232 SBSQ HOSP IP/OBS MODERATE 35: CPT

## 2020-04-06 RX ORDER — SODIUM CHLORIDE 0.65 %
1 AEROSOL, SPRAY (ML) NASAL
Refills: 0 | Status: DISCONTINUED | OUTPATIENT
Start: 2020-04-06 | End: 2020-04-27

## 2020-04-06 RX ADMIN — Medication 200 MILLIGRAM(S): at 04:01

## 2020-04-06 RX ADMIN — Medication 650 MILLIGRAM(S): at 16:42

## 2020-04-06 RX ADMIN — Medication 200 MILLIGRAM(S): at 16:30

## 2020-04-06 RX ADMIN — CARVEDILOL PHOSPHATE 25 MILLIGRAM(S): 80 CAPSULE, EXTENDED RELEASE ORAL at 04:01

## 2020-04-06 RX ADMIN — ENOXAPARIN SODIUM 40 MILLIGRAM(S): 100 INJECTION SUBCUTANEOUS at 16:30

## 2020-04-06 RX ADMIN — CARVEDILOL PHOSPHATE 25 MILLIGRAM(S): 80 CAPSULE, EXTENDED RELEASE ORAL at 16:30

## 2020-04-06 NOTE — CHART NOTE - NSCHARTNOTEFT_GEN_A_CORE
Electrophysiology  Biotel Monitoring:    QTc on 4/4 QTc 419ms   As per protocol, no further Biotel monitoring required as QTc <500ms.   Care per Medicine team.

## 2020-04-06 NOTE — PROGRESS NOTE ADULT - ASSESSMENT
#COVID+, hypoxemic respiratory failure  *he is stable; likely improving slowly but still likely days away from possible discharge  -continue plaquenil course, finishes tomorrow 4/7  -oxygen support, titrate down as tolerated  -if worsens, will escalate therapy per protocol    #Hyponatremia  *likely SIADH from pulmonary disease vs poor PO  -continue to monitor, encourage PO  -consider urinary studies if worsens    #PMHx  -continue home carvedilol    lovenox ppx

## 2020-04-06 NOTE — PROGRESS NOTE ADULT - SUBJECTIVE AND OBJECTIVE BOX
[facility] Division of Hospital Medicine Progress Note    Patient is a 64y old  Male who presents with a chief complaint of hypoxia (05 Apr 2020 15:20)      SUBJECTIVE / OVERNIGHT EVENTS:  ADDITIONAL REVIEW OF SYSTEMS:    MEDICATIONS  (STANDING):  carvedilol 25 milliGRAM(s) Oral every 12 hours  enoxaparin Injectable 40 milliGRAM(s) SubCutaneous daily  hydroxychloroquine 200 milliGRAM(s) Oral every 12 hours  hydroxychloroquine   Oral     MEDICATIONS  (PRN):  acetaminophen   Tablet .. 650 milliGRAM(s) Oral every 4 hours PRN Temp greater or equal to 38.5C (101.3F)  benzocaine 15 mG/menthol 3.6 mG (Sugar-Free) Lozenge 1 Lozenge Oral every 4 hours PRN Sore Throat      CAPILLARY BLOOD GLUCOSE        I&O's Summary      PHYSICAL EXAM:  Vital Signs Last 24 Hrs  T(C): 37.2 (06 Apr 2020 11:27), Max: 38.8 (05 Apr 2020 21:11)  T(F): 99 (06 Apr 2020 11:27), Max: 101.9 (05 Apr 2020 21:11)  HR: 80 (06 Apr 2020 11:27) (80 - 95)  BP: 105/69 (06 Apr 2020 11:27) (105/69 - 179/96)  BP(mean): --  RR: 27 (06 Apr 2020 11:27) (20 - 27)  SpO2: 93% (06 Apr 2020 11:27) (91% - 95%)  CONSTITUTIONAL: NAD, well-developed, well-groomed  ENMT: Moist oral mucosa, no pharyngeal injection or exudates; normal dentition  RESPIRATORY: Normal respiratory effort; lungs are clear to auscultation bilaterally  CARDIOVASCULAR: Regular rate and rhythm, normal S1 and S2, no murmur/rub/gallop; No lower extremity edema; Peripheral pulses are 2+ bilaterally  ABDOMEN: Nontender to palpation, normoactive bowel sounds, no rebound/guarding; No hepatosplenomegaly  PSYCH: A+O to person, place, and time; affect appropriate  NEUROLOGY: CN 2-12 are intact and symmetric; no gross sensory deficits   SKIN: No rashes; no palpable lesions    LABS:                        11.9   8.55  )-----------( 298      ( 06 Apr 2020 07:06 )             34.5     04-06    133<L>  |  96  |  14  ----------------------------<  181<H>  3.8   |  25  |  0.76    Ca    8.7      06 Apr 2020 07:06    TPro  6.8  /  Alb  2.9<L>  /  TBili  0.6  /  DBili  x   /  AST  31  /  ALT  45  /  AlkPhos  52  04-06              COVID-19 PCR: Detected (04-02-20 @ 13:58)      RADIOLOGY & ADDITIONAL TESTS:  Imaging from Last 24 Hours:  Electrocardiogram/QTc Interval:    COORDINATION OF CARE:  Care Discussed with Consultants/Other Providers: [facility] Division of Hospital Medicine Progress Note    64 yom hx HTN admitted 4/2 w/ 10 days fevers, myalgias, exertional SOB for 2 days, COVID+  nonfocal xray but continuing oxygen needs  clinically stable    he has had fluctuating fevers, but no evidence of bacterial infection    today 4/6 reports continued SOB, even with talking, worse when trying to move    MEDICATIONS  (STANDING):  carvedilol 25 milliGRAM(s) Oral every 12 hours  enoxaparin Injectable 40 milliGRAM(s) SubCutaneous daily  hydroxychloroquine 200 milliGRAM(s) Oral every 12 hours  hydroxychloroquine   Oral     MEDICATIONS  (PRN):  acetaminophen   Tablet .. 650 milliGRAM(s) Oral every 4 hours PRN Temp greater or equal to 38.5C (101.3F)  benzocaine 15 mG/menthol 3.6 mG (Sugar-Free) Lozenge 1 Lozenge Oral every 4 hours PRN Sore Throat    PHYSICAL EXAM:  Vital Signs Last 24 Hrs  T(C): 37.2 (06 Apr 2020 11:27), Max: 38.8 (05 Apr 2020 21:11)  T(F): 99 (06 Apr 2020 11:27), Max: 101.9 (05 Apr 2020 21:11)  HR: 80 (06 Apr 2020 11:27) (80 - 95)  BP: 105/69 (06 Apr 2020 11:27) (105/69 - 179/96)  BP(mean): --  RR: 27 (06 Apr 2020 11:27) (20 - 27)  SpO2: 93% (06 Apr 2020 11:27) (91% - 95%)    Gen: fatigued appearing, no distress, sitting upright  Resp: conversational dyspnea, rales bilaterally, air movement is shallow  CV: regular    LABS:                        11.9   8.55  )-----------( 298      ( 06 Apr 2020 07:06 )             34.5     04-06    133<L>  |  96  |  14  ----------------------------<  181<H>  3.8   |  25  |  0.76    Ca    8.7      06 Apr 2020 07:06    TPro  6.8  /  Alb  2.9<L>  /  TBili  0.6  /  DBili  x   /  AST  31  /  ALT  45  /  AlkPhos  52  04-06    COVID-19 PCR: Detected (04-02-20 @ 13:58)    RADIOLOGY & ADDITIONAL TESTS:  Imaging from Last 24 Hours:  Electrocardiogram/QTc Interval:    COORDINATION OF CARE:  Care Discussed with Consultants/Other Providers:

## 2020-04-07 DIAGNOSIS — U07.1 COVID-19: ICD-10-CM

## 2020-04-07 DIAGNOSIS — Z29.9 ENCOUNTER FOR PROPHYLACTIC MEASURES, UNSPECIFIED: ICD-10-CM

## 2020-04-07 DIAGNOSIS — I10 ESSENTIAL (PRIMARY) HYPERTENSION: ICD-10-CM

## 2020-04-07 PROCEDURE — 99233 SBSQ HOSP IP/OBS HIGH 50: CPT

## 2020-04-07 RX ORDER — ALBUTEROL 90 UG/1
2 AEROSOL, METERED ORAL ONCE
Refills: 0 | Status: COMPLETED | OUTPATIENT
Start: 2020-04-07 | End: 2020-04-07

## 2020-04-07 RX ORDER — BENZOCAINE AND MENTHOL 5; 1 G/100ML; G/100ML
1 LIQUID ORAL THREE TIMES A DAY
Refills: 0 | Status: DISCONTINUED | OUTPATIENT
Start: 2020-04-07 | End: 2020-04-27

## 2020-04-07 RX ADMIN — ENOXAPARIN SODIUM 40 MILLIGRAM(S): 100 INJECTION SUBCUTANEOUS at 17:10

## 2020-04-07 RX ADMIN — Medication 1 SPRAY(S): at 00:39

## 2020-04-07 RX ADMIN — CARVEDILOL PHOSPHATE 25 MILLIGRAM(S): 80 CAPSULE, EXTENDED RELEASE ORAL at 04:49

## 2020-04-07 RX ADMIN — Medication 200 MILLIGRAM(S): at 04:49

## 2020-04-07 RX ADMIN — CARVEDILOL PHOSPHATE 25 MILLIGRAM(S): 80 CAPSULE, EXTENDED RELEASE ORAL at 17:10

## 2020-04-07 RX ADMIN — Medication 650 MILLIGRAM(S): at 04:49

## 2020-04-07 RX ADMIN — BENZOCAINE AND MENTHOL 1 LOZENGE: 5; 1 LIQUID ORAL at 23:38

## 2020-04-07 RX ADMIN — ALBUTEROL 2 PUFF(S): 90 AEROSOL, METERED ORAL at 03:14

## 2020-04-07 NOTE — PROGRESS NOTE ADULT - PROBLEM SELECTOR PLAN 1
Hypoxic resp failure due to COVID: symptoms 10 days prior to admission with fevers, myalgias.  Day 16.  Cxr clear 4/2  d-dimer 1148 (4/2)  CRP 3.08à10.03 (4/5)  ferritin 2423 (4/2)>1596 (4/5)  procalcitonin 0.19 (4/5)  Plaquenil 4/2-4/7  Not candidate for remdesivir due to presentation after 4 days of symptoms  per discussion with ID, considering IL6 trial tomorrow (closed today for enrollment)  still requiring 6L NC  encouraging proning

## 2020-04-07 NOTE — PROGRESS NOTE ADULT - SUBJECTIVE AND OBJECTIVE BOX
[facility] Division of Hospital Medicine  Joslyn Miller MD  Pager (M-F, 8A-5P): [phone]  Other Times:  [phone]    Patient is a 64y old  Male who presents with a chief complaint of hypoxia (06 Apr 2020 13:28)      SUBJECTIVE / OVERNIGHT EVENTS:  last fever 4/6 pm  still with SOB, some diarrhea  agreeable to proning    ADDITIONAL REVIEW OF SYSTEMS:    MEDICATIONS  (STANDING):  carvedilol 25 milliGRAM(s) Oral every 12 hours  enoxaparin Injectable 40 milliGRAM(s) SubCutaneous daily    MEDICATIONS  (PRN):  acetaminophen   Tablet .. 650 milliGRAM(s) Oral every 4 hours PRN Temp greater or equal to 38.5C (101.3F)  benzocaine 15 mG/menthol 3.6 mG (Sugar-Free) Lozenge 1 Lozenge Oral every 4 hours PRN Sore Throat  sodium chloride 0.65% Nasal 1 Spray(s) Both Nostrils four times a day PRN Nasal Congestion      CAPILLARY BLOOD GLUCOSE        I&O's Summary      PHYSICAL EXAM:  Vital Signs Last 24 Hrs  T(C): 36.8 (07 Apr 2020 12:49), Max: 37.8 (07 Apr 2020 03:40)  T(F): 98.3 (07 Apr 2020 12:49), Max: 100 (07 Apr 2020 03:40)  HR: 95 (07 Apr 2020 12:49) (80 - 96)  BP: 158/90 (07 Apr 2020 12:49) (145/85 - 171/93)  BP(mean): --  RR: 22 (07 Apr 2020 12:49) (22 - 27)  SpO2: 92% (07 Apr 2020 12:49) (91% - 97%)  CONSTITUTIONAL: NAD, well-developed, well-groomed  RESPIRATORY: No respiratory distress  CARDIOVASCULAR: Regular rate and rhythm  ABDOMEN: soft/nt/nd  PSYCH: A+O to person, place, and time; affect appropriate    LABS:                        11.9   8.55  )-----------( 298      ( 06 Apr 2020 07:06 )             34.5     04-06    133<L>  |  96  |  14  ----------------------------<  181<H>  3.8   |  25  |  0.76    Ca    8.7      06 Apr 2020 07:06    TPro  6.8  /  Alb  2.9<L>  /  TBili  0.6  /  DBili  x   /  AST  31  /  ALT  45  /  AlkPhos  52  04-06                RADIOLOGY & ADDITIONAL TESTS:  Results Reviewed:   Imaging Personally Reviewed:  Electrocardiogram Personally Reviewed:    COORDINATION OF CARE:  Care Discussed with Consultants/Other Providers [Y/N]:  Prior or Outpatient Records Reviewed [Y/N]:

## 2020-04-07 NOTE — PROGRESS NOTE ADULT - ASSESSMENT
63yo M pmh HTN admitted 4/2 with acute hypoxic respiratory failure due to COVID.    HTN: coreg    PPX:lovenox    GOC:    Dispo: pcp Natanael

## 2020-04-08 LAB
ALBUMIN SERPL ELPH-MCNC: 3.1 G/DL — LOW (ref 3.3–5)
ALP SERPL-CCNC: 55 U/L — SIGNIFICANT CHANGE UP (ref 40–120)
ALT FLD-CCNC: 46 U/L — HIGH (ref 10–45)
ANION GAP SERPL CALC-SCNC: 14 MMOL/L — SIGNIFICANT CHANGE UP (ref 5–17)
AST SERPL-CCNC: 29 U/L — SIGNIFICANT CHANGE UP (ref 10–40)
BILIRUB SERPL-MCNC: 0.6 MG/DL — SIGNIFICANT CHANGE UP (ref 0.2–1.2)
BUN SERPL-MCNC: 15 MG/DL — SIGNIFICANT CHANGE UP (ref 7–23)
CALCIUM SERPL-MCNC: 8.8 MG/DL — SIGNIFICANT CHANGE UP (ref 8.4–10.5)
CHLORIDE SERPL-SCNC: 97 MMOL/L — SIGNIFICANT CHANGE UP (ref 96–108)
CO2 SERPL-SCNC: 25 MMOL/L — SIGNIFICANT CHANGE UP (ref 22–31)
CREAT SERPL-MCNC: 0.76 MG/DL — SIGNIFICANT CHANGE UP (ref 0.5–1.3)
CRP SERPL-MCNC: 10.69 MG/DL — HIGH (ref 0–0.4)
FERRITIN SERPL-MCNC: 1492 NG/ML — HIGH (ref 30–400)
GLUCOSE SERPL-MCNC: 151 MG/DL — HIGH (ref 70–99)
POTASSIUM SERPL-MCNC: 3.8 MMOL/L — SIGNIFICANT CHANGE UP (ref 3.5–5.3)
POTASSIUM SERPL-SCNC: 3.8 MMOL/L — SIGNIFICANT CHANGE UP (ref 3.5–5.3)
PROCALCITONIN SERPL-MCNC: 0.16 NG/ML — HIGH (ref 0.02–0.1)
PROT SERPL-MCNC: 6.9 G/DL — SIGNIFICANT CHANGE UP (ref 6–8.3)
SODIUM SERPL-SCNC: 136 MMOL/L — SIGNIFICANT CHANGE UP (ref 135–145)

## 2020-04-08 PROCEDURE — 99232 SBSQ HOSP IP/OBS MODERATE 35: CPT

## 2020-04-08 RX ORDER — CARVEDILOL PHOSPHATE 80 MG/1
25 CAPSULE, EXTENDED RELEASE ORAL EVERY 12 HOURS
Refills: 0 | Status: DISCONTINUED | OUTPATIENT
Start: 2020-04-08 | End: 2020-04-27

## 2020-04-08 RX ORDER — NIFEDIPINE 30 MG
30 TABLET, EXTENDED RELEASE 24 HR ORAL DAILY
Refills: 0 | Status: DISCONTINUED | OUTPATIENT
Start: 2020-04-08 | End: 2020-04-10

## 2020-04-08 RX ADMIN — Medication 30 MILLIGRAM(S): at 20:33

## 2020-04-08 RX ADMIN — CARVEDILOL PHOSPHATE 25 MILLIGRAM(S): 80 CAPSULE, EXTENDED RELEASE ORAL at 17:29

## 2020-04-08 RX ADMIN — Medication 1 SPRAY(S): at 11:53

## 2020-04-08 RX ADMIN — CARVEDILOL PHOSPHATE 25 MILLIGRAM(S): 80 CAPSULE, EXTENDED RELEASE ORAL at 05:01

## 2020-04-08 RX ADMIN — ENOXAPARIN SODIUM 40 MILLIGRAM(S): 100 INJECTION SUBCUTANEOUS at 11:53

## 2020-04-08 NOTE — CHART NOTE - NSCHARTNOTEFT_GEN_A_CORE
Nutrition Initial Assessment    Nutrition Consult Received: Yes [   ]  No [ x ]    Reason for Initial Nutrition Assessment: LOS admission on [facility]    Source of Information: Unable to conduct a fact to face interview due to COVID-19 isolation policy. Information obtained from a phone call with the pt's wife (attempted to contact pt x 2 via phone call, however no answer) and from the EMR.    Admitting Diagnosis: COVID-19 (+)    PAST MEDICAL & SURGICAL HISTORY:  Prediabetes  Hypertension  History of cholecystectomy  H/O hernia repair    Subjective Information: Per wife, pt was eating poorly PTA for approximately 2 weeks, was mostly accepting soup and fruits. She endorses pt had diarrhea as well as loss of smell and taste. She feels that pt has lost weight however she is unable to quantify amount and is unsure of pt's UBW. She denies pt had any food allergies or intolerance, denies he had any issues with chewing or swallowing difficulty. He was taking vitamin C supplements PTA.    GI Issues: (+) diarrhea, decreased appetite    Current Nutrition Order: Diet, DASH/TLC    PO Intake:   Good (%) [   ]    Fair (50-75%) [   ]    Poor (<50%) [ x  ]    Skin Integrity: No pressure injuries per flowsheet records    Labs:   04-08 Na136 mmol/L Glu 151 mg/dL<H> K+ 3.8 mmol/L Cr  0.76 mg/dL BUN 15 mg/dL Phos n/a   Alb 3.1 g/dL<L> PAB n/a         Medications:  MEDICATIONS  (STANDING):  carvedilol 25 milliGRAM(s) Oral every 12 hours  enoxaparin Injectable 40 milliGRAM(s) SubCutaneous daily    MEDICATIONS  (PRN):  acetaminophen   Tablet .. 650 milliGRAM(s) Oral every 4 hours PRN Temp greater or equal to 38.5C (101.3F)  benzocaine 15 mG/menthol 3.6 mG (Sugar-Free) Lozenge 1 Lozenge Oral three times a day PRN Sore Throat  benzocaine 15 mG/menthol 3.6 mG (Sugar-Free) Lozenge 1 Lozenge Oral every 4 hours PRN Sore Throat  sodium chloride 0.65% Nasal 1 Spray(s) Both Nostrils four times a day PRN Nasal Congestion    Admitted Anthropometrics:    Height (cm): 162.6 (04-02-20 @ 20:32)  Weight (kg): 66 (04-02-20 @ 20:32)  BMI (kg/m2): 25 (04-02-20 @ 20:32)    Nutrition Focused Physical Exam: Unable to complete due to limited isolation contact precautions at this time.     Estimated Energy Needs (_25_ kcal/kg- _30_ kcal/kg): 1650 - 1980 kcal  Estimated Protein Needs (_1.2_ g/kg- _1.4_ g/kg): 79 - 92 grams  Based on weight of: 66 kg (admit wt)    [ x ] Nutrition Diagnosis: Inadequate oral intake related to poor appetite, loss of taste in setting if viral illness as evidenced by po intake <50% x 2 weeks  [  ] No active nutrition diagnosis at this time  [  ] Current medical condition precludes nutrition intervention    Goal: Pt will meet >75% of estimated nutrition needs via po diet    Nutrition Interventions:     Recommendations: Recommend continue current DASH/TLC diet to promote heart health, can consider liberalizing if poor po persists. Will add JUJU 4oz. Mightyshake 3 x daily for additional 600 kcal, 21 grams protein. Food preferences obtained from wife and honored on menu. Monitor po intake and hospital course for additional nutrition interventions as deemed necessary.    RD to follow-up per protocol: Brenda Cantu MS, RDN, CDN, CDE, CSOW. #[phone]

## 2020-04-08 NOTE — PROGRESS NOTE ADULT - SUBJECTIVE AND OBJECTIVE BOX
[facility] Division of Hospital Medicine Progress Note    64 yom hx HTN admitted 4/2 w/ 10 days fevers, myalgias, exertional SOB for 2 days, COVID+  fluctuating fevers, clinically stable, but remains SOB requiring continued NC    Today c/o congestion, and continued SOB, cough    MEDICATIONS  (STANDING):  carvedilol 25 milliGRAM(s) Oral every 12 hours  enoxaparin Injectable 40 milliGRAM(s) SubCutaneous daily    MEDICATIONS  (PRN):  acetaminophen   Tablet .. 650 milliGRAM(s) Oral every 4 hours PRN Temp greater or equal to 38.5C (101.3F)  benzocaine 15 mG/menthol 3.6 mG (Sugar-Free) Lozenge 1 Lozenge Oral three times a day PRN Sore Throat  benzocaine 15 mG/menthol 3.6 mG (Sugar-Free) Lozenge 1 Lozenge Oral every 4 hours PRN Sore Throat  sodium chloride 0.65% Nasal 1 Spray(s) Both Nostrils four times a day PRN Nasal Congestion    I&O's Summary    07 Apr 2020 07:01  -  08 Apr 2020 07:00  --------------------------------------------------------  IN: 0 mL / OUT: 150 mL / NET: -150 mL    PHYSICAL EXAM:  Vital Signs Last 24 Hrs  T(C): 36.7 (08 Apr 2020 13:01), Max: 37.3 (07 Apr 2020 21:18)  T(F): 98 (08 Apr 2020 13:01), Max: 99.1 (07 Apr 2020 21:18)  HR: 85 (08 Apr 2020 13:01) (85 - 98)  BP: 151/92 (08 Apr 2020 13:01) (151/92 - 161/94)  BP(mean): --  RR: 20 (08 Apr 2020 13:01) (20 - 20)  SpO2: 94% (08 Apr 2020 13:01) (92% - 94%)    Gen: no distress  Resp: mild conversational dyspnea; lungs w/ rales and severely impaired air entry bilaterally  CV: regular    LABS:    04-08    136  |  97  |  15  ----------------------------<  151<H>  3.8   |  25  |  0.76    Ca    8.8      08 Apr 2020 07:10    TPro  6.9  /  Alb  3.1<L>  /  TBili  0.6  /  DBili  x   /  AST  29  /  ALT  46<H>  /  AlkPhos  55  04-08    COVID-19 PCR: Detected (04-02-20 @ 13:58)    RADIOLOGY & ADDITIONAL TESTS:  Imaging from Last 24 Hours:  Electrocardiogram/QTc Interval:    COORDINATION OF CARE:  Care Discussed with Consultants/Other Providers:

## 2020-04-08 NOTE — PROGRESS NOTE ADULT - ASSESSMENT
#COVID19+, hypoxemic respiratory failure  -s/p plaquenil   -continuing supplemental O2 support, wean as tolerated  -spoke to ID today 4/8, and the patient was added to IL-6 trial list for evaluation  -advised proning but he cannot tolerate    #HTN  *consistently above goal while here  -continuing carvedilol 25 mg bid  -nifedipine 30 mg daily added 4/8    lovenox ppx

## 2020-04-09 PROCEDURE — 99232 SBSQ HOSP IP/OBS MODERATE 35: CPT

## 2020-04-09 RX ADMIN — ENOXAPARIN SODIUM 40 MILLIGRAM(S): 100 INJECTION SUBCUTANEOUS at 13:02

## 2020-04-09 RX ADMIN — Medication 30 MILLIGRAM(S): at 04:08

## 2020-04-09 RX ADMIN — CARVEDILOL PHOSPHATE 25 MILLIGRAM(S): 80 CAPSULE, EXTENDED RELEASE ORAL at 04:08

## 2020-04-09 RX ADMIN — CARVEDILOL PHOSPHATE 25 MILLIGRAM(S): 80 CAPSULE, EXTENDED RELEASE ORAL at 17:16

## 2020-04-09 NOTE — PROGRESS NOTE ADULT - ASSESSMENT
#COVID19+, hypoxemic respiratory failure  -s/p plaquenil finished 4/7  -continuing supplemental O2 support, wean as tolerated  -spoke to ID 4/8, pt was added to IL-6 trial list for evaluation  -he is unable to tolerate being prone, but I continue to recommend this or lateral decubitus    #HTN  -carvedilol 25 mg bid  -nifedipine xl 30 mg daily added 4/8    lovenox ppx

## 2020-04-09 NOTE — PROGRESS NOTE ADULT - SUBJECTIVE AND OBJECTIVE BOX
[facility] Division of Hospital Medicine Progress Note    64 yom hx HTN admitted 4/2 w/ 10 days fevers, myalgias, exertional SOB for 2 days, COVID+  fluctuating fevers, clinically stable, but remains SOB requiring continued NC    MEDICATIONS  (STANDING):  carvedilol 25 milliGRAM(s) Oral every 12 hours  enoxaparin Injectable 40 milliGRAM(s) SubCutaneous daily  NIFEdipine XL 30 milliGRAM(s) Oral daily    MEDICATIONS  (PRN):  acetaminophen   Tablet .. 650 milliGRAM(s) Oral every 4 hours PRN Temp greater or equal to 38.5C (101.3F)  benzocaine 15 mG/menthol 3.6 mG (Sugar-Free) Lozenge 1 Lozenge Oral three times a day PRN Sore Throat  benzocaine 15 mG/menthol 3.6 mG (Sugar-Free) Lozenge 1 Lozenge Oral every 4 hours PRN Sore Throat  sodium chloride 0.65% Nasal 1 Spray(s) Both Nostrils four times a day PRN Nasal Congestion      CAPILLARY BLOOD GLUCOSE    I&O's Summary      PHYSICAL EXAM:  Vital Signs Last 24 Hrs  T(C): 37.3 (09 Apr 2020 04:01), Max: 37.3 (09 Apr 2020 04:01)  T(F): 99.1 (09 Apr 2020 04:01), Max: 99.1 (09 Apr 2020 04:01)  HR: 91 (09 Apr 2020 04:01) (85 - 91)  BP: 164/91 (09 Apr 2020 04:01) (135/90 - 164/91)  BP(mean): --  RR: 20 (09 Apr 2020 04:01) (20 - 20)  SpO2: 94% (09 Apr 2020 04:01) (91% - 94%)        LABS:    04-08    136  |  97  |  15  ----------------------------<  151<H>  3.8   |  25  |  0.76    Ca    8.8      08 Apr 2020 07:10    TPro  6.9  /  Alb  3.1<L>  /  TBili  0.6  /  DBili  x   /  AST  29  /  ALT  46<H>  /  AlkPhos  55  04-08    COVID-19 PCR: Detected (04-02-20 @ 13:58)    RADIOLOGY & ADDITIONAL TESTS:  Imaging from Last 24 Hours:  Electrocardiogram/QTc Interval:    COORDINATION OF CARE:  Care Discussed with Consultants/Other Providers: [facility] Division of Hospital Medicine Progress Note    64 yom hx HTN admitted 4/2 w/ 10 days fevers, myalgias, exertional SOB for 2 days, COVID+  fluctuating fevers, clinically stable, but remains SOB requiring continued NC    today continued SOB, lots of questions about treatment    MEDICATIONS  (STANDING):  carvedilol 25 milliGRAM(s) Oral every 12 hours  enoxaparin Injectable 40 milliGRAM(s) SubCutaneous daily  NIFEdipine XL 30 milliGRAM(s) Oral daily    MEDICATIONS  (PRN):  acetaminophen   Tablet .. 650 milliGRAM(s) Oral every 4 hours PRN Temp greater or equal to 38.5C (101.3F)  benzocaine 15 mG/menthol 3.6 mG (Sugar-Free) Lozenge 1 Lozenge Oral three times a day PRN Sore Throat  benzocaine 15 mG/menthol 3.6 mG (Sugar-Free) Lozenge 1 Lozenge Oral every 4 hours PRN Sore Throat  sodium chloride 0.65% Nasal 1 Spray(s) Both Nostrils four times a day PRN Nasal Congestion    CAPILLARY BLOOD GLUCOSE    I&O's Summary    PHYSICAL EXAM:  Vital Signs Last 24 Hrs  T(C): 37.3 (09 Apr 2020 04:01), Max: 37.3 (09 Apr 2020 04:01)  T(F): 99.1 (09 Apr 2020 04:01), Max: 99.1 (09 Apr 2020 04:01)  HR: 91 (09 Apr 2020 04:01) (85 - 91)  BP: 164/91 (09 Apr 2020 04:01) (135/90 - 164/91)  BP(mean): --  RR: 20 (09 Apr 2020 04:01) (20 - 20)  SpO2: 94% (09 Apr 2020 04:01) (91% - 94%)    Gen: no distress  Resp: conversational dyspnea; poor air entry, bilateral rales and rhonchi  CV: regular    LABS:    04-08    136  |  97  |  15  ----------------------------<  151<H>  3.8   |  25  |  0.76    Ca    8.8      08 Apr 2020 07:10    TPro  6.9  /  Alb  3.1<L>  /  TBili  0.6  /  DBili  x   /  AST  29  /  ALT  46<H>  /  AlkPhos  55  04-08    COVID-19 PCR: Detected (04-02-20 @ 13:58)    RADIOLOGY & ADDITIONAL TESTS:  Imaging from Last 24 Hours:  Electrocardiogram/QTc Interval:    COORDINATION OF CARE:  Care Discussed with Consultants/Other Providers:

## 2020-04-10 LAB
ALBUMIN SERPL ELPH-MCNC: 3 G/DL — LOW (ref 3.3–5)
ALP SERPL-CCNC: 57 U/L — SIGNIFICANT CHANGE UP (ref 40–120)
ALT FLD-CCNC: 84 U/L — HIGH (ref 10–45)
ANION GAP SERPL CALC-SCNC: 12 MMOL/L — SIGNIFICANT CHANGE UP (ref 5–17)
AST SERPL-CCNC: 51 U/L — HIGH (ref 10–40)
BILIRUB SERPL-MCNC: 0.4 MG/DL — SIGNIFICANT CHANGE UP (ref 0.2–1.2)
BUN SERPL-MCNC: 15 MG/DL — SIGNIFICANT CHANGE UP (ref 7–23)
CALCIUM SERPL-MCNC: 8.8 MG/DL — SIGNIFICANT CHANGE UP (ref 8.4–10.5)
CHLORIDE SERPL-SCNC: 97 MMOL/L — SIGNIFICANT CHANGE UP (ref 96–108)
CO2 SERPL-SCNC: 25 MMOL/L — SIGNIFICANT CHANGE UP (ref 22–31)
CREAT SERPL-MCNC: 0.74 MG/DL — SIGNIFICANT CHANGE UP (ref 0.5–1.3)
CRP SERPL-MCNC: 4.23 MG/DL — HIGH (ref 0–0.4)
D DIMER BLD IA.RAPID-MCNC: 3179 NG/ML DDU — HIGH
FERRITIN SERPL-MCNC: 1369 NG/ML — HIGH (ref 30–400)
GLUCOSE SERPL-MCNC: 159 MG/DL — HIGH (ref 70–99)
HCT VFR BLD CALC: 33.3 % — LOW (ref 39–50)
HGB BLD-MCNC: 11.9 G/DL — LOW (ref 13–17)
MCHC RBC-ENTMCNC: 32 PG — SIGNIFICANT CHANGE UP (ref 27–34)
MCHC RBC-ENTMCNC: 35.7 GM/DL — SIGNIFICANT CHANGE UP (ref 32–36)
MCV RBC AUTO: 89.5 FL — SIGNIFICANT CHANGE UP (ref 80–100)
NRBC # BLD: 0 /100 WBCS — SIGNIFICANT CHANGE UP (ref 0–0)
PLATELET # BLD AUTO: 378 K/UL — SIGNIFICANT CHANGE UP (ref 150–400)
POTASSIUM SERPL-MCNC: 3.8 MMOL/L — SIGNIFICANT CHANGE UP (ref 3.5–5.3)
POTASSIUM SERPL-SCNC: 3.8 MMOL/L — SIGNIFICANT CHANGE UP (ref 3.5–5.3)
PROT SERPL-MCNC: 7.1 G/DL — SIGNIFICANT CHANGE UP (ref 6–8.3)
RBC # BLD: 3.72 M/UL — LOW (ref 4.2–5.8)
RBC # FLD: 11.8 % — SIGNIFICANT CHANGE UP (ref 10.3–14.5)
SODIUM SERPL-SCNC: 134 MMOL/L — LOW (ref 135–145)
WBC # BLD: 8.22 K/UL — SIGNIFICANT CHANGE UP (ref 3.8–10.5)
WBC # FLD AUTO: 8.22 K/UL — SIGNIFICANT CHANGE UP (ref 3.8–10.5)

## 2020-04-10 PROCEDURE — 99232 SBSQ HOSP IP/OBS MODERATE 35: CPT

## 2020-04-10 RX ORDER — ENOXAPARIN SODIUM 100 MG/ML
70 INJECTION SUBCUTANEOUS
Refills: 0 | Status: DISCONTINUED | OUTPATIENT
Start: 2020-04-10 | End: 2020-04-10

## 2020-04-10 RX ORDER — ENOXAPARIN SODIUM 100 MG/ML
40 INJECTION SUBCUTANEOUS DAILY
Refills: 0 | Status: DISCONTINUED | OUTPATIENT
Start: 2020-04-10 | End: 2020-04-27

## 2020-04-10 RX ORDER — NIFEDIPINE 30 MG
60 TABLET, EXTENDED RELEASE 24 HR ORAL DAILY
Refills: 0 | Status: DISCONTINUED | OUTPATIENT
Start: 2020-04-10 | End: 2020-04-14

## 2020-04-10 RX ADMIN — Medication 60 MILLIGRAM(S): at 08:47

## 2020-04-10 RX ADMIN — ENOXAPARIN SODIUM 40 MILLIGRAM(S): 100 INJECTION SUBCUTANEOUS at 17:37

## 2020-04-10 RX ADMIN — CARVEDILOL PHOSPHATE 25 MILLIGRAM(S): 80 CAPSULE, EXTENDED RELEASE ORAL at 05:17

## 2020-04-10 RX ADMIN — CARVEDILOL PHOSPHATE 25 MILLIGRAM(S): 80 CAPSULE, EXTENDED RELEASE ORAL at 17:37

## 2020-04-10 NOTE — PROGRESS NOTE ADULT - ASSESSMENT
#COVID19+, hypoxemic respiratory failure  -d/t elevated d-dimer, with his consent I plan to start therapeutic lovenox today 4/10  -s/p plaquenil finished 4/7  -continuing supplemental O2 support, wean as tolerated  -spoke to ID 4/8, pt was added to IL-6 trial list for evaluation  -he is unable to tolerate being prone, but I continue to recommend this or lateral decubitus    #Elevated d-dimer  -with his consent I plan to start therapeutic lovenox today 4/10    #HTN  -home carvedilol 25 mg bid  -nifedipine 60 mg (new, started 4/9)    lovenox ppx #COVID19+, hypoxemic respiratory failure  -s/p plaquenil finished 4/7  -continuing supplemental O2 support, wean as tolerated  -spoke to ID 4/8, pt was added to IL-6 trial list for evaluation  -if suspicion for PE is high, would need to obtain CTA prior to therapeutic lovenox    #Elevated d-dimer  -if suspicion for PE is high, would need to obtain CTA prior to therapeutic lovenox    #HTN  -home carvedilol 25 mg bid  -nifedipine 60 mg (new, started 4/9)    lovenox ppx

## 2020-04-10 NOTE — CHART NOTE - NSCHARTNOTEFT_GEN_A_CORE
Started on therapeutic dose of Lovenox as recommended by  Dr Monroe, secondary to high risk for DVT/PE , given elevated  D dimer.    Monica Contreras NP  [phone]

## 2020-04-10 NOTE — PROGRESS NOTE ADULT - SUBJECTIVE AND OBJECTIVE BOX
[facility] Division of Hospital Medicine Progress Note    64 yom hx HTN admitted 4/2 w/ 10 days fevers, myalgias, exertional SOB for 2 days, COVID+  fluctuating fevers, clinically stable, but remains SOB requiring continued NC        MEDICATIONS  (STANDING):  carvedilol 25 milliGRAM(s) Oral every 12 hours  enoxaparin Injectable 40 milliGRAM(s) SubCutaneous daily  NIFEdipine XL 60 milliGRAM(s) Oral daily    MEDICATIONS  (PRN):  acetaminophen   Tablet .. 650 milliGRAM(s) Oral every 4 hours PRN Temp greater or equal to 38.5C (101.3F)  benzocaine 15 mG/menthol 3.6 mG (Sugar-Free) Lozenge 1 Lozenge Oral three times a day PRN Sore Throat  benzocaine 15 mG/menthol 3.6 mG (Sugar-Free) Lozenge 1 Lozenge Oral every 4 hours PRN Sore Throat  sodium chloride 0.65% Nasal 1 Spray(s) Both Nostrils four times a day PRN Nasal Congestion    PHYSICAL EXAM:  Vital Signs Last 24 Hrs  T(C): 36.9 (10 Apr 2020 03:19), Max: 37.8 (09 Apr 2020 21:14)  T(F): 98.5 (10 Apr 2020 03:19), Max: 100.1 (09 Apr 2020 21:14)  HR: 81 (10 Apr 2020 03:19) (81 - 89)  BP: 167/94 (10 Apr 2020 03:19) (151/84 - 167/94)  BP(mean): --  RR: 20 (10 Apr 2020 03:19) (20 - 20)  SpO2: 94% (10 Apr 2020 03:19) (92% - 96%)    gen:  CV:  resp:    LABS:                        11.9   8.22  )-----------( 378      ( 10 Apr 2020 06:45 )             33.3     04-10    134<L>  |  97  |  15  ----------------------------<  159<H>  3.8   |  25  |  0.74    Ca    8.8      10 Apr 2020 06:45    TPro  7.1  /  Alb  3.0<L>  /  TBili  0.4  /  DBili  x   /  AST  51<H>  /  ALT  84<H>  /  AlkPhos  57  04-10    COVID-19 PCR: Detected (04-02-20 @ 13:58)    RADIOLOGY & ADDITIONAL TESTS:  Imaging from Last 24 Hours:  Electrocardiogram/QTc Interval:    COORDINATION OF CARE:  Care Discussed with Consultants/Other Providers: [facility] Division of Hospital Medicine Progress Note    64 yom hx HTN admitted 4/2 w/ 10 days fevers, myalgias, exertional SOB for 2 days, COVID+  fluctuating fevers, clinically stable, but remains SOB requiring continued NC    MEDICATIONS  (STANDING):  carvedilol 25 milliGRAM(s) Oral every 12 hours  enoxaparin Injectable 40 milliGRAM(s) SubCutaneous daily  NIFEdipine XL 60 milliGRAM(s) Oral daily    MEDICATIONS  (PRN):  acetaminophen   Tablet .. 650 milliGRAM(s) Oral every 4 hours PRN Temp greater or equal to 38.5C (101.3F)  benzocaine 15 mG/menthol 3.6 mG (Sugar-Free) Lozenge 1 Lozenge Oral three times a day PRN Sore Throat  benzocaine 15 mG/menthol 3.6 mG (Sugar-Free) Lozenge 1 Lozenge Oral every 4 hours PRN Sore Throat  sodium chloride 0.65% Nasal 1 Spray(s) Both Nostrils four times a day PRN Nasal Congestion    PHYSICAL EXAM:  Vital Signs Last 24 Hrs  T(C): 36.9 (10 Apr 2020 03:19), Max: 37.8 (09 Apr 2020 21:14)  T(F): 98.5 (10 Apr 2020 03:19), Max: 100.1 (09 Apr 2020 21:14)  HR: 81 (10 Apr 2020 03:19) (81 - 89)  BP: 167/94 (10 Apr 2020 03:19) (151/84 - 167/94)  BP(mean): --  RR: 20 (10 Apr 2020 03:19) (20 - 20)  SpO2: 94% (10 Apr 2020 03:19) (92% - 96%)    gen: no distress  CV: regular  resp: mild conversational SOB; rales and rhonchi bilaterally; severely impaired air movement    LABS:                        11.9   8.22  )-----------( 378      ( 10 Apr 2020 06:45 )             33.3     04-10    134<L>  |  97  |  15  ----------------------------<  159<H>  3.8   |  25  |  0.74    Ca    8.8      10 Apr 2020 06:45    TPro  7.1  /  Alb  3.0<L>  /  TBili  0.4  /  DBili  x   /  AST  51<H>  /  ALT  84<H>  /  AlkPhos  57  04-10    COVID-19 PCR: Detected (04-02-20 @ 13:58)    RADIOLOGY & ADDITIONAL TESTS:  Imaging from Last 24 Hours:  Electrocardiogram/QTc Interval:    COORDINATION OF CARE:  Care Discussed with Consultants/Other Providers:

## 2020-04-10 NOTE — CHART NOTE - NSCHARTNOTEFT_GEN_A_CORE
Correction: therapeutic lovenox was NOT started today; I changed the plan for this patient  we will be obtaining CTA chest in the future if suspicion for PE remains high

## 2020-04-11 PROCEDURE — 99233 SBSQ HOSP IP/OBS HIGH 50: CPT

## 2020-04-11 RX ADMIN — CARVEDILOL PHOSPHATE 25 MILLIGRAM(S): 80 CAPSULE, EXTENDED RELEASE ORAL at 04:34

## 2020-04-11 RX ADMIN — Medication 60 MILLIGRAM(S): at 04:34

## 2020-04-11 RX ADMIN — CARVEDILOL PHOSPHATE 25 MILLIGRAM(S): 80 CAPSULE, EXTENDED RELEASE ORAL at 16:30

## 2020-04-11 RX ADMIN — ENOXAPARIN SODIUM 40 MILLIGRAM(S): 100 INJECTION SUBCUTANEOUS at 16:30

## 2020-04-11 NOTE — PROGRESS NOTE ADULT - PROBLEM SELECTOR PLAN 1
Hypoxic resp failure due to COVID: symptoms 10 days prior to admission with fevers, myalgias.   Cxr clear 4/2  d-dimer 1148 (4/2)>3179 (4/10)  CRP 3.08 (4/2)>10.03 (4/5)>4.23 (4/10)  ferritin 2423 (4/2)>1596 (4/5)>1492 (4/8)>1369 (4/10)  procalcitonin 0.19 (4/5)>0.16 (4/8)  Plaquenil completed 4/2-4/7  Per d/w ID Dr Curry, Il6 enrollment not open this weekend  still requiring 6L NC  encouraging proning  - Maintain on airborne isolation.  - Continue with O2 as needed via nasal cannula and up-titrate as needed. If on non-rebreather mask, start continuous oximetry monitoring.  - Obtain daily room air O2 saturations once O2 requirements stabilize.  - Acetaminophen 650 mg PO q4h PRN fever. Limit use of NSAIDs.  - Defer systemic steroids/interleukin inhibitor at this time; would consider if inflammatory markers are elevated and patient has worsening hypoxia.

## 2020-04-11 NOTE — PROGRESS NOTE ADULT - PROBLEM SELECTOR PLAN 3
lovenox 40 daily    IMPROVE VTE Individual Risk Assessment  [  ] Previous VTE                                                  3  [  ] Thrombophilia                                               2  [  ] Lower limb paralysis                                      2        (unable to hold up >15 seconds)    [  ] Current Cancer                                              2         (within 6 months)  [  ] Immobilization > 24 hrs                                1  [  ] ICU/CCU stay > 24 hours                              1  [x] Age > 60                                                      1    IMPROVE VTE Score 1    By age>60 and Ddimer> 2x ULN, consider extended thromboprophylaxis with lovenox 40 qd or rivaroxaban 10mg daily for 31-39 days post discharge once stabilized

## 2020-04-11 NOTE — PROGRESS NOTE ADULT - ASSESSMENT
63yo M pmh HTN admitted 4/2 with acute hypoxic respiratory failure due to COVID completed plaquenil.

## 2020-04-11 NOTE — PROGRESS NOTE ADULT - SUBJECTIVE AND OBJECTIVE BOX
Medicine Progress Note    Patient is a 64y old  Male who presents with a chief complaint of hypoxia (10 Apr 2020 08:47)      SUBJECTIVE / OVERNIGHT EVENTS:  main complaint is dry nose making him feel like its difficult to breathe  oxygen has been stable on 6L for 1 day but per RN Janessa he desats when attempts made to titrate down from 6L    ADDITIONAL REVIEW OF SYSTEMS:    MEDICATIONS  (STANDING):  carvedilol 25 milliGRAM(s) Oral every 12 hours  enoxaparin Injectable 40 milliGRAM(s) SubCutaneous daily  NIFEdipine XL 60 milliGRAM(s) Oral daily    MEDICATIONS  (PRN):  acetaminophen   Tablet .. 650 milliGRAM(s) Oral every 4 hours PRN Temp greater or equal to 38.5C (101.3F)  benzocaine 15 mG/menthol 3.6 mG (Sugar-Free) Lozenge 1 Lozenge Oral three times a day PRN Sore Throat  benzocaine 15 mG/menthol 3.6 mG (Sugar-Free) Lozenge 1 Lozenge Oral every 4 hours PRN Sore Throat  sodium chloride 0.65% Nasal 1 Spray(s) Both Nostrils four times a day PRN Nasal Congestion    CAPILLARY BLOOD GLUCOSE        I&O's Summary      PHYSICAL EXAM:  Vital Signs Last 24 Hrs  T(C): 37.5 (11 Apr 2020 13:15), Max: 37.5 (11 Apr 2020 13:15)  T(F): 99.5 (11 Apr 2020 13:15), Max: 99.5 (11 Apr 2020 13:15)  HR: 86 (11 Apr 2020 13:15) (71 - 90)  BP: 129/84 (11 Apr 2020 13:15) (129/84 - 151/91)  BP(mean): --  RR: 20 (11 Apr 2020 13:15) (20 - 20)  SpO2: 97% (11 Apr 2020 13:15) (94% - 97%)  CONSTITUTIONAL: NAD, well-developed, well-groomed  RESPIRATORY: No respiratory distress  CARDIOVASCULAR: Regular rate and rhythm  ABDOMEN: soft/nt/nd  PSYCH: A+O to person, place, and time; affect appropriate      LABS:                        11.9   8.22  )-----------( 378      ( 10 Apr 2020 06:45 )             33.3     04-10    134<L>  |  97  |  15  ----------------------------<  159<H>  3.8   |  25  |  0.74    Ca    8.8      10 Apr 2020 06:45    TPro  7.1  /  Alb  3.0<L>  /  TBili  0.4  /  DBili  x   /  AST  51<H>  /  ALT  84<H>  /  AlkPhos  57  04-10              COVID-19 PCR: Detected (02 Apr 2020 13:58)      RADIOLOGY & ADDITIONAL TESTS:  Imaging from Last 24 Hours:    Electrocardiogram/QTc Interval:    COORDINATION OF CARE:  Care Discussed with Consultants/Other Providers:

## 2020-04-12 LAB
ALBUMIN SERPL ELPH-MCNC: 3.2 G/DL — LOW (ref 3.3–5)
ALP SERPL-CCNC: 52 U/L — SIGNIFICANT CHANGE UP (ref 40–120)
ALT FLD-CCNC: 72 U/L — HIGH (ref 10–45)
ANION GAP SERPL CALC-SCNC: 13 MMOL/L — SIGNIFICANT CHANGE UP (ref 5–17)
AST SERPL-CCNC: 36 U/L — SIGNIFICANT CHANGE UP (ref 10–40)
BILIRUB SERPL-MCNC: 0.5 MG/DL — SIGNIFICANT CHANGE UP (ref 0.2–1.2)
BUN SERPL-MCNC: 14 MG/DL — SIGNIFICANT CHANGE UP (ref 7–23)
CALCIUM SERPL-MCNC: 9.1 MG/DL — SIGNIFICANT CHANGE UP (ref 8.4–10.5)
CHLORIDE SERPL-SCNC: 98 MMOL/L — SIGNIFICANT CHANGE UP (ref 96–108)
CO2 SERPL-SCNC: 25 MMOL/L — SIGNIFICANT CHANGE UP (ref 22–31)
CREAT SERPL-MCNC: 0.77 MG/DL — SIGNIFICANT CHANGE UP (ref 0.5–1.3)
CRP SERPL-MCNC: 2.1 MG/DL — HIGH (ref 0–0.4)
D DIMER BLD IA.RAPID-MCNC: 2072 NG/ML DDU — HIGH
FERRITIN SERPL-MCNC: 1119 NG/ML — HIGH (ref 30–400)
GLUCOSE SERPL-MCNC: 122 MG/DL — HIGH (ref 70–99)
HCT VFR BLD CALC: 37.3 % — LOW (ref 39–50)
HGB BLD-MCNC: 12.4 G/DL — LOW (ref 13–17)
MCHC RBC-ENTMCNC: 30.3 PG — SIGNIFICANT CHANGE UP (ref 27–34)
MCHC RBC-ENTMCNC: 33.2 GM/DL — SIGNIFICANT CHANGE UP (ref 32–36)
MCV RBC AUTO: 91.2 FL — SIGNIFICANT CHANGE UP (ref 80–100)
NRBC # BLD: 0 /100 WBCS — SIGNIFICANT CHANGE UP (ref 0–0)
PLATELET # BLD AUTO: 365 K/UL — SIGNIFICANT CHANGE UP (ref 150–400)
POTASSIUM SERPL-MCNC: 4.4 MMOL/L — SIGNIFICANT CHANGE UP (ref 3.5–5.3)
POTASSIUM SERPL-SCNC: 4.4 MMOL/L — SIGNIFICANT CHANGE UP (ref 3.5–5.3)
PROT SERPL-MCNC: 7.4 G/DL — SIGNIFICANT CHANGE UP (ref 6–8.3)
RBC # BLD: 4.09 M/UL — LOW (ref 4.2–5.8)
RBC # FLD: 12 % — SIGNIFICANT CHANGE UP (ref 10.3–14.5)
SODIUM SERPL-SCNC: 136 MMOL/L — SIGNIFICANT CHANGE UP (ref 135–145)
WBC # BLD: 8.91 K/UL — SIGNIFICANT CHANGE UP (ref 3.8–10.5)
WBC # FLD AUTO: 8.91 K/UL — SIGNIFICANT CHANGE UP (ref 3.8–10.5)

## 2020-04-12 PROCEDURE — 99233 SBSQ HOSP IP/OBS HIGH 50: CPT

## 2020-04-12 RX ADMIN — ENOXAPARIN SODIUM 40 MILLIGRAM(S): 100 INJECTION SUBCUTANEOUS at 18:09

## 2020-04-12 RX ADMIN — Medication 1 SPRAY(S): at 04:49

## 2020-04-12 RX ADMIN — CARVEDILOL PHOSPHATE 25 MILLIGRAM(S): 80 CAPSULE, EXTENDED RELEASE ORAL at 04:19

## 2020-04-12 RX ADMIN — Medication 60 MILLIGRAM(S): at 04:19

## 2020-04-12 RX ADMIN — CARVEDILOL PHOSPHATE 25 MILLIGRAM(S): 80 CAPSULE, EXTENDED RELEASE ORAL at 18:10

## 2020-04-12 RX ADMIN — BENZOCAINE AND MENTHOL 1 LOZENGE: 5; 1 LIQUID ORAL at 18:10

## 2020-04-12 NOTE — PROGRESS NOTE ADULT - SUBJECTIVE AND OBJECTIVE BOX
Medicine Progress Note    Patient is a 64y old  Male who presents with a chief complaint of hypoxia (11 Apr 2020 15:10)      SUBJECTIVE / OVERNIGHT EVENTS:  no acute events overnight.  Still c/o cough, [place], dyspnea, feels its hard to complete sentences  No HA/diarrhea  Upset he hasn't been enrolled in a trial.    ADDITIONAL REVIEW OF SYSTEMS:    MEDICATIONS  (STANDING):  carvedilol 25 milliGRAM(s) Oral every 12 hours  enoxaparin Injectable 40 milliGRAM(s) SubCutaneous daily  NIFEdipine XL 60 milliGRAM(s) Oral daily    MEDICATIONS  (PRN):  acetaminophen   Tablet .. 650 milliGRAM(s) Oral every 4 hours PRN Temp greater or equal to 38.5C (101.3F)  benzocaine 15 mG/menthol 3.6 mG (Sugar-Free) Lozenge 1 Lozenge Oral three times a day PRN Sore Throat  benzocaine 15 mG/menthol 3.6 mG (Sugar-Free) Lozenge 1 Lozenge Oral every 4 hours PRN Sore Throat  sodium chloride 0.65% Nasal 1 Spray(s) Both Nostrils four times a day PRN Nasal Congestion    CAPILLARY BLOOD GLUCOSE        I&O's Summary    11 Apr 2020 07:01  -  12 Apr 2020 07:00  --------------------------------------------------------  IN: 0 mL / OUT: 400 mL / NET: -400 mL        PHYSICAL EXAM:  Vital Signs Last 24 Hrs  T(C): 36.9 (12 Apr 2020 12:01), Max: 37 (11 Apr 2020 23:51)  T(F): 98.4 (12 Apr 2020 12:01), Max: 98.6 (11 Apr 2020 23:51)  HR: 88 (12 Apr 2020 12:01) (84 - 88)  BP: 120/80 (12 Apr 2020 12:01) (120/80 - 155/90)  BP(mean): --  RR: 20 (12 Apr 2020 12:01) (20 - 20)  SpO2: 93% (12 Apr 2020 12:01) (91% - 98%)  CONSTITUTIONAL: NAD, well-developed, well-groomed  RESPIRATORY: No respiratory distress  CARDIOVASCULAR: Regular rate and rhythm  ABDOMEN: soft/nt/nd  PSYCH: A+O to person, place, and time; affect appropriate    LABS:                        12.4   8.91  )-----------( 365      ( 12 Apr 2020 07:38 )             37.3     04-12    136  |  98  |  14  ----------------------------<  122<H>  4.4   |  25  |  0.77    Ca    9.1      12 Apr 2020 07:38    TPro  7.4  /  Alb  3.2<L>  /  TBili  0.5  /  DBili  x   /  AST  36  /  ALT  72<H>  /  AlkPhos  52  04-12              COVID-19 PCR: Detected (02 Apr 2020 13:58)      RADIOLOGY & ADDITIONAL TESTS:  Imaging from Last 24 Hours:    Electrocardiogram/QTc Interval:    COORDINATION OF CARE:  Care Discussed with Consultants/Other Providers:

## 2020-04-12 NOTE — PROGRESS NOTE ADULT - ASSESSMENT
65yo M pmh HTN admitted 4/2 with acute hypoxic respiratory failure due to COVID completed plaquenil.

## 2020-04-12 NOTE — PROGRESS NOTE ADULT - ATTENDING COMMENTS
attempted to reach family at listed numbers.  No answer: wife Kim [phone] and son Eleuterio Trinh [phone]

## 2020-04-12 NOTE — PROGRESS NOTE ADULT - PROBLEM SELECTOR PLAN 1
Hypoxic resp failure due to COVID: symptoms 10 days prior to admission with fevers, myalgias.   Cxr clear 4/2  d-dimer 1148 (4/2)>3179 (4/10)>2072 (4/12)  CRP 3.08 (4/2)>10.03 (4/5)>4.23 (4/10)>2.1 (4/12) DOWNTRENDING  ferritin 2423 (4/2)>1596 (4/5)>1492 (4/8)>1369 (4/10)>1119 (4/12) DOWNTRENDING  procalcitonin 0.19 (4/5)>0.16 (4/8)  Plaquenil completed 4/2-4/7  Per d/w ID Dr Curry, Il6 enrollment not open this weekend  oxygen requirments lower, now on 4L (from 6L NC)  encouraging proning  - Maintain on airborne isolation.  - Continue with O2 as needed via nasal cannula and up-titrate as needed. If on non-rebreather mask, start continuous oximetry monitoring.  - Obtain daily room air O2 saturations once O2 requirements stabilize.  - Acetaminophen 650 mg PO q4h PRN fever. Limit use of NSAIDs.  - Defer systemic steroids/interleukin inhibitor at this time; would consider if inflammatory markers are elevated and patient has worsening hypoxia.

## 2020-04-13 PROCEDURE — 99232 SBSQ HOSP IP/OBS MODERATE 35: CPT

## 2020-04-13 RX ADMIN — CARVEDILOL PHOSPHATE 25 MILLIGRAM(S): 80 CAPSULE, EXTENDED RELEASE ORAL at 04:33

## 2020-04-13 RX ADMIN — ENOXAPARIN SODIUM 40 MILLIGRAM(S): 100 INJECTION SUBCUTANEOUS at 11:51

## 2020-04-13 RX ADMIN — Medication 60 MILLIGRAM(S): at 04:33

## 2020-04-13 RX ADMIN — BENZOCAINE AND MENTHOL 1 LOZENGE: 5; 1 LIQUID ORAL at 16:46

## 2020-04-13 RX ADMIN — CARVEDILOL PHOSPHATE 25 MILLIGRAM(S): 80 CAPSULE, EXTENDED RELEASE ORAL at 16:47

## 2020-04-13 NOTE — PROGRESS NOTE ADULT - ASSESSMENT
#COVID19+, hypoxemic respiratory failure  *he is having slow and steady improvement  -s/p plaquenil finished 4/7  -continuing supplemental O2 support, wean as tolerated  -not a candidate for trial therapy at this point per inclusion criteria  *qualifies for extended VTE prophylaxis at discharge    #Elevated d-dimer  -if suspicion for PE is high, will obtain CTA    #HTN  -home carvedilol 25 mg bid  -nifedipine 60 mg (new, started 4/9)    Tanvir updated 4/13    wife Tanvir [phone] and son Eleuterio Trinh [phone]    lovenox ppx #COVID19+, hypoxemic respiratory failure  *he continues to have very slow but steady improvement  -s/p plaquenil finished 4/7  -continuing supplemental O2 support, wean as tolerated  -not a candidate for trial therapy at this point per inclusion criteria  *qualifies for extended VTE prophylaxis at discharge    #Elevated d-dimer  *downtrending  -if suspicion for PE is high, will obtain CTA    #HTN  -home carvedilol 25 mg bid  -nifedipine 60 mg (new, started 4/9)    Tanvir updated 4/13    wife Tanvir [phone] and son Eleuterio Trinh [phone]    lovenox ppx

## 2020-04-13 NOTE — PROGRESS NOTE ADULT - SUBJECTIVE AND OBJECTIVE BOX
[facility] Division of Hospital Medicine Progress Note    64 yom hx HTN admitted 4/2 w/ 10 days fevers, myalgias, exertional SOB for 2 days, COVID+  fluctuating fevers, clinically stable, but remains SOB requiring continued NC    MEDICATIONS  (STANDING):  carvedilol 25 milliGRAM(s) Oral every 12 hours  enoxaparin Injectable 40 milliGRAM(s) SubCutaneous daily  NIFEdipine XL 60 milliGRAM(s) Oral daily    MEDICATIONS  (PRN):  acetaminophen   Tablet .. 650 milliGRAM(s) Oral every 4 hours PRN Temp greater or equal to 38.5C (101.3F)  benzocaine 15 mG/menthol 3.6 mG (Sugar-Free) Lozenge 1 Lozenge Oral three times a day PRN Sore Throat  sodium chloride 0.65% Nasal 1 Spray(s) Both Nostrils four times a day PRN Nasal Congestion    PHYSICAL EXAM:  Vital Signs Last 24 Hrs  T(C): 37 (13 Apr 2020 04:23), Max: 37 (13 Apr 2020 04:23)  T(F): 98.6 (13 Apr 2020 04:23), Max: 98.6 (13 Apr 2020 04:23)  HR: 87 (13 Apr 2020 04:23) (87 - 88)  BP: 141/95 (13 Apr 2020 04:23) (120/80 - 141/95)  BP(mean): --  RR: 20 (13 Apr 2020 04:23) (20 - 20)  SpO2: 96% (13 Apr 2020 04:23) (93% - 96%)    gen:  CV:  resp:    LABS:                        12.4   8.91  )-----------( 365      ( 12 Apr 2020 07:38 )             37.3     04-12    136  |  98  |  14  ----------------------------<  122<H>  4.4   |  25  |  0.77    Ca    9.1      12 Apr 2020 07:38    TPro  7.4  /  Alb  3.2<L>  /  TBili  0.5  /  DBili  x   /  AST  36  /  ALT  72<H>  /  AlkPhos  52  04-12    COVID-19 PCR: Detected (04-02-20 @ 13:58)    RADIOLOGY & ADDITIONAL TESTS:  Imaging from Last 24 Hours:  Electrocardiogram/QTc Interval:    COORDINATION OF CARE:  Care Discussed with Consultants/Other Providers: [facility] Division of Hospital Medicine Progress Note    64 yom hx HTN admitted 4/2 w/ 10 days fevers, myalgias, exertional SOB for 2 days, COVID+ 4/2    Continues to have SOB with walking, frustrated by slow recovery    MEDICATIONS  (STANDING):  carvedilol 25 milliGRAM(s) Oral every 12 hours  enoxaparin Injectable 40 milliGRAM(s) SubCutaneous daily  NIFEdipine XL 60 milliGRAM(s) Oral daily    MEDICATIONS  (PRN):  acetaminophen   Tablet .. 650 milliGRAM(s) Oral every 4 hours PRN Temp greater or equal to 38.5C (101.3F)  benzocaine 15 mG/menthol 3.6 mG (Sugar-Free) Lozenge 1 Lozenge Oral three times a day PRN Sore Throat  sodium chloride 0.65% Nasal 1 Spray(s) Both Nostrils four times a day PRN Nasal Congestion    PHYSICAL EXAM:  Vital Signs Last 24 Hrs  T(C): 37 (13 Apr 2020 04:23), Max: 37 (13 Apr 2020 04:23)  T(F): 98.6 (13 Apr 2020 04:23), Max: 98.6 (13 Apr 2020 04:23)  HR: 87 (13 Apr 2020 04:23) (87 - 88)  BP: 141/95 (13 Apr 2020 04:23) (120/80 - 141/95)  BP(mean): --  RR: 20 (13 Apr 2020 04:23) (20 - 20)  SpO2: 96% (13 Apr 2020 04:23) (93% - 96%)    gen: comfortable  CV: regular, normal rate  resp: conversational SOB, rales rhonchi bilaterally     LABS:                        12.4   8.91  )-----------( 365      ( 12 Apr 2020 07:38 )             37.3     04-12    136  |  98  |  14  ----------------------------<  122<H>  4.4   |  25  |  0.77    Ca    9.1      12 Apr 2020 07:38    TPro  7.4  /  Alb  3.2<L>  /  TBili  0.5  /  DBili  x   /  AST  36  /  ALT  72<H>  /  AlkPhos  52  04-12    COVID-19 PCR: Detected (04-02-20 @ 13:58)    RADIOLOGY & ADDITIONAL TESTS:  Imaging from Last 24 Hours:  Electrocardiogram/QTc Interval:    COORDINATION OF CARE:  Care Discussed with Consultants/Other Providers:

## 2020-04-14 LAB
ALBUMIN SERPL ELPH-MCNC: 3.3 G/DL — SIGNIFICANT CHANGE UP (ref 3.3–5)
ALP SERPL-CCNC: 54 U/L — SIGNIFICANT CHANGE UP (ref 40–120)
ALT FLD-CCNC: 61 U/L — HIGH (ref 10–45)
ANION GAP SERPL CALC-SCNC: 10 MMOL/L — SIGNIFICANT CHANGE UP (ref 5–17)
AST SERPL-CCNC: 25 U/L — SIGNIFICANT CHANGE UP (ref 10–40)
BILIRUB SERPL-MCNC: 0.5 MG/DL — SIGNIFICANT CHANGE UP (ref 0.2–1.2)
BUN SERPL-MCNC: 18 MG/DL — SIGNIFICANT CHANGE UP (ref 7–23)
CALCIUM SERPL-MCNC: 9.5 MG/DL — SIGNIFICANT CHANGE UP (ref 8.4–10.5)
CHLORIDE SERPL-SCNC: 95 MMOL/L — LOW (ref 96–108)
CO2 SERPL-SCNC: 27 MMOL/L — SIGNIFICANT CHANGE UP (ref 22–31)
CREAT SERPL-MCNC: 0.89 MG/DL — SIGNIFICANT CHANGE UP (ref 0.5–1.3)
CRP SERPL-MCNC: 1.25 MG/DL — HIGH (ref 0–0.4)
D DIMER BLD IA.RAPID-MCNC: 1859 NG/ML DDU — HIGH
FERRITIN SERPL-MCNC: 1073 NG/ML — HIGH (ref 30–400)
GLUCOSE SERPL-MCNC: 182 MG/DL — HIGH (ref 70–99)
HCT VFR BLD CALC: 37.6 % — LOW (ref 39–50)
HGB BLD-MCNC: 12.6 G/DL — LOW (ref 13–17)
MCHC RBC-ENTMCNC: 30.6 PG — SIGNIFICANT CHANGE UP (ref 27–34)
MCHC RBC-ENTMCNC: 33.5 GM/DL — SIGNIFICANT CHANGE UP (ref 32–36)
MCV RBC AUTO: 91.3 FL — SIGNIFICANT CHANGE UP (ref 80–100)
NRBC # BLD: 0 /100 WBCS — SIGNIFICANT CHANGE UP (ref 0–0)
PLATELET # BLD AUTO: 344 K/UL — SIGNIFICANT CHANGE UP (ref 150–400)
POTASSIUM SERPL-MCNC: 4.1 MMOL/L — SIGNIFICANT CHANGE UP (ref 3.5–5.3)
POTASSIUM SERPL-SCNC: 4.1 MMOL/L — SIGNIFICANT CHANGE UP (ref 3.5–5.3)
PROT SERPL-MCNC: 7.6 G/DL — SIGNIFICANT CHANGE UP (ref 6–8.3)
RBC # BLD: 4.12 M/UL — LOW (ref 4.2–5.8)
RBC # FLD: 12.1 % — SIGNIFICANT CHANGE UP (ref 10.3–14.5)
SODIUM SERPL-SCNC: 132 MMOL/L — LOW (ref 135–145)
WBC # BLD: 8.98 K/UL — SIGNIFICANT CHANGE UP (ref 3.8–10.5)
WBC # FLD AUTO: 8.98 K/UL — SIGNIFICANT CHANGE UP (ref 3.8–10.5)

## 2020-04-14 PROCEDURE — 99232 SBSQ HOSP IP/OBS MODERATE 35: CPT

## 2020-04-14 RX ORDER — NIFEDIPINE 30 MG
30 TABLET, EXTENDED RELEASE 24 HR ORAL DAILY
Refills: 0 | Status: DISCONTINUED | OUTPATIENT
Start: 2020-04-15 | End: 2020-04-27

## 2020-04-14 RX ADMIN — BENZOCAINE AND MENTHOL 1 LOZENGE: 5; 1 LIQUID ORAL at 04:58

## 2020-04-14 RX ADMIN — ENOXAPARIN SODIUM 40 MILLIGRAM(S): 100 INJECTION SUBCUTANEOUS at 11:00

## 2020-04-14 RX ADMIN — CARVEDILOL PHOSPHATE 25 MILLIGRAM(S): 80 CAPSULE, EXTENDED RELEASE ORAL at 04:58

## 2020-04-14 RX ADMIN — Medication 60 MILLIGRAM(S): at 04:58

## 2020-04-14 RX ADMIN — CARVEDILOL PHOSPHATE 25 MILLIGRAM(S): 80 CAPSULE, EXTENDED RELEASE ORAL at 17:28

## 2020-04-14 NOTE — PROGRESS NOTE ADULT - ASSESSMENT
#COVID19+, hypoxemic respiratory failure  -s/p plaquenil finished 4/7  -continuing supplemental O2 support, wean as tolerated  -not a candidate for trial therapy at this point per inclusion criteria other than EAP convalescent plasma, which would consider if he stops improving  -he qualifies for extended VTE prophylaxis at discharge    #Elevated d-dimer  -monitor, obtain CTA if worsens    #HTN  -home carvedilol 25 mg bid  -nifedipine 60 mg (new, started 4/9)    Tanvir updated 4/13    wife Tanvir [phone] and son Eleuterio Trinh [phone]    lovenox ppx #COVID19+, hypoxemic respiratory failure  -s/p plaquenil finished 4/7  -continuing supplemental O2 support, wean as tolerated  -not a candidate for trial therapy at this point per inclusion criteria other than EAP convalescent plasma, which would consider if he stops improving  -he qualifies for extended VTE prophylaxis at discharge    #HTN  -home carvedilol 25 mg bid  -nifedipine 30 mg (reported dizziness on 4/14 on 60 mg)    Tanvir updated 4/14  wife Tanvir [phone] and son Eleuterio Trinh [phone]    lovenox ppx

## 2020-04-15 PROCEDURE — 99233 SBSQ HOSP IP/OBS HIGH 50: CPT

## 2020-04-15 RX ADMIN — Medication 30 MILLIGRAM(S): at 05:29

## 2020-04-15 RX ADMIN — CARVEDILOL PHOSPHATE 25 MILLIGRAM(S): 80 CAPSULE, EXTENDED RELEASE ORAL at 05:29

## 2020-04-15 RX ADMIN — CARVEDILOL PHOSPHATE 25 MILLIGRAM(S): 80 CAPSULE, EXTENDED RELEASE ORAL at 16:15

## 2020-04-15 RX ADMIN — ENOXAPARIN SODIUM 40 MILLIGRAM(S): 100 INJECTION SUBCUTANEOUS at 16:15

## 2020-04-15 NOTE — PROGRESS NOTE ADULT - PROBLEM SELECTOR PLAN 1
Hypoxic resp failure due to COVID: symptoms 10 days prior to admission with fevers, myalgias.   Cxr clear 4/2  d-dimer 114 (4/2)>3179 (4/10)>2072 (4/12)>1859 (4/14) DOWNTRENDING  CRP 3.08 (4/2)>10.03 (4/5)>4.23 (4/10)>2.1 (4/12)>1.25 (4/14) DOWNTRENDING  ferritin 2423 (4/2)>1596 (4/5)>1492 (4/8)>1369 (4/10)>1119 (4/12)>1073 (4/14)  DOWNTRENDING  procalcitonin 0.19 (4/5)>0.16 (4/8)  Plaquenil completed 4/2-4/7  oxygen requirements lower, RA at rest, but still desaturates with ambulation  encouraging proning  - Maintain on airborne isolation.  - Obtain daily 5 minute room air O2 saturations  - Acetaminophen 650 mg PO q4h PRN fever. Limit use of NSAIDs.

## 2020-04-15 NOTE — PROGRESS NOTE ADULT - SUBJECTIVE AND OBJECTIVE BOX
Medicine Progress Note    Patient is a 64y old  Male who presents with a chief complaint of hypoxia (14 Apr 2020 09:37)      SUBJECTIVE / OVERNIGHT EVENTS:  no acute events overnight  No nausea/vomiting/diarrhea  Still with [place]HE[place]    ADDITIONAL REVIEW OF SYSTEMS:    MEDICATIONS  (STANDING):  carvedilol 25 milliGRAM(s) Oral every 12 hours  enoxaparin Injectable 40 milliGRAM(s) SubCutaneous daily  NIFEdipine XL 30 milliGRAM(s) Oral daily    MEDICATIONS  (PRN):  acetaminophen   Tablet .. 650 milliGRAM(s) Oral every 4 hours PRN Temp greater or equal to 38.5C (101.3F)  benzocaine 15 mG/menthol 3.6 mG (Sugar-Free) Lozenge 1 Lozenge Oral three times a day PRN Sore Throat  sodium chloride 0.65% Nasal 1 Spray(s) Both Nostrils four times a day PRN Nasal Congestion    CAPILLARY BLOOD GLUCOSE        I&O's Summary      PHYSICAL EXAM:  Vital Signs Last 24 Hrs  T(C): 36.9 (15 Apr 2020 12:49), Max: 37.1 (14 Apr 2020 21:31)  T(F): 98.5 (15 Apr 2020 12:49), Max: 98.8 (14 Apr 2020 21:31)  HR: 82 (15 Apr 2020 12:49) (82 - 93)  BP: 117/78 (15 Apr 2020 12:49) (115/78 - 131/84)  BP(mean): --  RR: 20 (15 Apr 2020 12:49) (18 - 22)  SpO2: 94% (15 Apr 2020 12:49) (80% - 94%) 80% RA with ambulation  CONSTITUTIONAL: NAD, well-developed, well-groomed  RESPIRATORY: No respiratory distress  CARDIOVASCULAR: Regular rate and rhythm  ABDOMEN: soft/nt/nd  PSYCH: A+O to person, place, and time; affect appropriate    LABS:                        12.6   8.98  )-----------( 344      ( 14 Apr 2020 07:04 )             37.6     04-14    132<L>  |  95<L>  |  18  ----------------------------<  182<H>  4.1   |  27  |  0.89    Ca    9.5      14 Apr 2020 06:57    TPro  7.6  /  Alb  3.3  /  TBili  0.5  /  DBili  x   /  AST  25  /  ALT  61<H>  /  AlkPhos  54  04-14              COVID-19 PCR: Detected (02 Apr 2020 13:58)      RADIOLOGY & ADDITIONAL TESTS:  Imaging from Last 24 Hours:    Electrocardiogram/QTc Interval:    COORDINATION OF CARE:  Care Discussed with Consultants/Other Providers:

## 2020-04-15 NOTE — PROGRESS NOTE ADULT - ASSESSMENT
63yo M pmh HTN admitted 4/2 with acute hypoxic respiratory failure due to COVID completed plaquenil overall improving but still desaturates with ambulation.

## 2020-04-16 ENCOUNTER — TRANSCRIPTION ENCOUNTER (OUTPATIENT)
Age: 65
End: 2020-04-16

## 2020-04-16 LAB
ALBUMIN SERPL ELPH-MCNC: 3.1 G/DL — LOW (ref 3.3–5)
ALP SERPL-CCNC: 58 U/L — SIGNIFICANT CHANGE UP (ref 40–120)
ALT FLD-CCNC: 56 U/L — HIGH (ref 10–45)
ANION GAP SERPL CALC-SCNC: 13 MMOL/L — SIGNIFICANT CHANGE UP (ref 5–17)
AST SERPL-CCNC: 24 U/L — SIGNIFICANT CHANGE UP (ref 10–40)
BILIRUB SERPL-MCNC: 0.5 MG/DL — SIGNIFICANT CHANGE UP (ref 0.2–1.2)
BUN SERPL-MCNC: 20 MG/DL — SIGNIFICANT CHANGE UP (ref 7–23)
CALCIUM SERPL-MCNC: 9.2 MG/DL — SIGNIFICANT CHANGE UP (ref 8.4–10.5)
CHLORIDE SERPL-SCNC: 99 MMOL/L — SIGNIFICANT CHANGE UP (ref 96–108)
CO2 SERPL-SCNC: 23 MMOL/L — SIGNIFICANT CHANGE UP (ref 22–31)
CREAT SERPL-MCNC: 0.87 MG/DL — SIGNIFICANT CHANGE UP (ref 0.5–1.3)
CRP SERPL-MCNC: 0.77 MG/DL — HIGH (ref 0–0.4)
D DIMER BLD IA.RAPID-MCNC: 2685 NG/ML DDU — HIGH
FERRITIN SERPL-MCNC: 977 NG/ML — HIGH (ref 30–400)
GLUCOSE SERPL-MCNC: 125 MG/DL — HIGH (ref 70–99)
HCT VFR BLD CALC: 40.1 % — SIGNIFICANT CHANGE UP (ref 39–50)
HGB BLD-MCNC: 13.2 G/DL — SIGNIFICANT CHANGE UP (ref 13–17)
MCHC RBC-ENTMCNC: 30.2 PG — SIGNIFICANT CHANGE UP (ref 27–34)
MCHC RBC-ENTMCNC: 32.9 GM/DL — SIGNIFICANT CHANGE UP (ref 32–36)
MCV RBC AUTO: 91.8 FL — SIGNIFICANT CHANGE UP (ref 80–100)
NRBC # BLD: 0 /100 WBCS — SIGNIFICANT CHANGE UP (ref 0–0)
PLATELET # BLD AUTO: 286 K/UL — SIGNIFICANT CHANGE UP (ref 150–400)
POTASSIUM SERPL-MCNC: 4.2 MMOL/L — SIGNIFICANT CHANGE UP (ref 3.5–5.3)
POTASSIUM SERPL-SCNC: 4.2 MMOL/L — SIGNIFICANT CHANGE UP (ref 3.5–5.3)
PROT SERPL-MCNC: 8 G/DL — SIGNIFICANT CHANGE UP (ref 6–8.3)
RBC # BLD: 4.37 M/UL — SIGNIFICANT CHANGE UP (ref 4.2–5.8)
RBC # FLD: 12.3 % — SIGNIFICANT CHANGE UP (ref 10.3–14.5)
SODIUM SERPL-SCNC: 135 MMOL/L — SIGNIFICANT CHANGE UP (ref 135–145)
WBC # BLD: 9.39 K/UL — SIGNIFICANT CHANGE UP (ref 3.8–10.5)
WBC # FLD AUTO: 9.39 K/UL — SIGNIFICANT CHANGE UP (ref 3.8–10.5)

## 2020-04-16 PROCEDURE — 99233 SBSQ HOSP IP/OBS HIGH 50: CPT

## 2020-04-16 RX ADMIN — CARVEDILOL PHOSPHATE 25 MILLIGRAM(S): 80 CAPSULE, EXTENDED RELEASE ORAL at 06:22

## 2020-04-16 RX ADMIN — CARVEDILOL PHOSPHATE 25 MILLIGRAM(S): 80 CAPSULE, EXTENDED RELEASE ORAL at 17:22

## 2020-04-16 RX ADMIN — Medication 30 MILLIGRAM(S): at 06:22

## 2020-04-16 RX ADMIN — BENZOCAINE AND MENTHOL 1 LOZENGE: 5; 1 LIQUID ORAL at 18:13

## 2020-04-16 RX ADMIN — ENOXAPARIN SODIUM 40 MILLIGRAM(S): 100 INJECTION SUBCUTANEOUS at 11:54

## 2020-04-16 RX ADMIN — BENZOCAINE AND MENTHOL 1 LOZENGE: 5; 1 LIQUID ORAL at 06:23

## 2020-04-16 NOTE — DISCHARGE NOTE PROVIDER - NSDCCPCAREPLAN_GEN_ALL_CORE_FT
PRINCIPAL DISCHARGE DIAGNOSIS  Diagnosis: COVID-19  Assessment and Plan of Treatment: You tested positive for COVID 19.  You no longer require hospitalization.  Please restrict activities outside of your home except for getting medical care.  Do not go to work, school, or public areas.  Avoid using public transportation, ride-sharing, or taxis.  Separate yourself from other people and animals in your home.  Call ahead before visiting your doctor.  Wear a facemask when you are around other people. Cover your cough and sneezes.  Clean your hands often.  Avoid sharing personal household items.  Clean all frequently touched surfaces daily.      SECONDARY DISCHARGE DIAGNOSES  Diagnosis: Hypertension, unspecified type  Assessment and Plan of Treatment: Please continue blood pressure medication and follow up with your PCP/cardiologist for telehealth visit within one week.

## 2020-04-16 NOTE — DISCHARGE NOTE PROVIDER - PROVIDER TOKENS
FREE:[LAST:[Arinao],FIRST:[Natanael],PHONE:[phone]],FAX:[(   )    -]] FREE:[LAST:[Ascenio],FIRST:[Natanael],PHONE:[phone]:[(   )    -],FOLLOWUP:[1 week]]

## 2020-04-16 NOTE — DISCHARGE NOTE PROVIDER - NSDCFUADDAPPT_GEN_ALL_CORE_FT
f/u with your PCP in 1-2 weeks   if you cannot get f/u appointment with your PCP you may call   [phone] for post discharge f/u

## 2020-04-16 NOTE — PROGRESS NOTE ADULT - ASSESSMENT
65yo M pmh HTN admitted 4/2 with acute hypoxic respiratory failure due to COVID completed plaquenil overall improving but still desaturates with ambulation.

## 2020-04-16 NOTE — PROGRESS NOTE ADULT - PROBLEM SELECTOR PLAN 1
Hypoxic resp failure due to COVID: symptoms 10 days prior to admission with fevers, myalgias.   Cxr clear 4/2  d-dimer 114 (4/2)>3179 (4/10)>2072 (4/12)>1859 (4/14)>2685 (4/16)UPTRENDING  CRP 3.08 (4/2)>10.03 (4/5)>4.23 (4/10)>2.1 (4/12)>1.25 (4/14)>0.77 (4/16) DOWNTRENDING  ferritin 2423 (4/2)>1596 (4/5)>1492 (4/8)>1369 (4/10)>1119 (4/12)>1073 (4/14)  >977 (4/16) DOWNTRENDING  procalcitonin 0.19 (4/5)>0.16 (4/8)  Plaquenil completed 4/2-4/7  oxygen requirements lower, RA at rest, but still desaturates with ambulation  encouraging proning  - Maintain on airborne isolation.  - Obtain daily 5 minute room air O2 saturations  - Acetaminophen 650 mg PO q4h PRN fever. Limit use of NSAIDs.

## 2020-04-16 NOTE — DISCHARGE NOTE PROVIDER - CARE PROVIDER_API CALL
Natanael Wooten  Phone: [phone]  Fax: (   )    -  Follow Up Time: Natanael Wooten  Phone: [phone]  Fax: (   )    -  Follow Up Time: 1 week

## 2020-04-16 NOTE — PROGRESS NOTE ADULT - SUBJECTIVE AND OBJECTIVE BOX
Medicine Progress Note    Patient is a 64y old  Male who presents with a chief complaint of hypoxia (16 Apr 2020 08:32)      SUBJECTIVE / OVERNIGHT EVENTS:  feels well but ongoing WINCHESTER    ADDITIONAL REVIEW OF SYSTEMS:    MEDICATIONS  (STANDING):  carvedilol 25 milliGRAM(s) Oral every 12 hours  enoxaparin Injectable 40 milliGRAM(s) SubCutaneous daily  NIFEdipine XL 30 milliGRAM(s) Oral daily    MEDICATIONS  (PRN):  acetaminophen   Tablet .. 650 milliGRAM(s) Oral every 4 hours PRN Temp greater or equal to 38.5C (101.3F)  benzocaine 15 mG/menthol 3.6 mG (Sugar-Free) Lozenge 1 Lozenge Oral three times a day PRN Sore Throat  sodium chloride 0.65% Nasal 1 Spray(s) Both Nostrils four times a day PRN Nasal Congestion    CAPILLARY BLOOD GLUCOSE        I&O's Summary    15 Apr 2020 07:01  -  16 Apr 2020 07:00  --------------------------------------------------------  IN: 120 mL / OUT: 0 mL / NET: 120 mL        PHYSICAL EXAM:  Vital Signs Last 24 Hrs  T(C): 37.1 (16 Apr 2020 12:19), Max: 37.1 (16 Apr 2020 12:19)  T(F): 98.8 (16 Apr 2020 12:19), Max: 98.8 (16 Apr 2020 12:19)  HR: 96 (16 Apr 2020 12:19) (83 - 96)  BP: 118/77 (16 Apr 2020 12:19) (118/77 - 145/88)  BP(mean): --  RR: 25 (16 Apr 2020 12:20) (18 - 25)  SpO2: 86% (16 Apr 2020 12:20) (86% - 95%), 86% RA with ambulation  CONSTITUTIONAL: NAD, well-developed, well-groomed  RESPIRATORY: No respiratory distress  CARDIOVASCULAR: Regular rate and rhythm  ABDOMEN: soft/nt/nd  PSYCH: A+O to person, place, and time; affect appropriate      LABS:                        13.2   9.39  )-----------( 286      ( 16 Apr 2020 07:11 )             40.1     04-16    135  |  99  |  20  ----------------------------<  125<H>  4.2   |  23  |  0.87    Ca    9.2      16 Apr 2020 07:10    TPro  8.0  /  Alb  3.1<L>  /  TBili  0.5  /  DBili  x   /  AST  24  /  ALT  56<H>  /  AlkPhos  58  04-16              COVID-19 PCR: Detected (02 Apr 2020 13:58)      RADIOLOGY & ADDITIONAL TESTS:  Imaging from Last 24 Hours:    Electrocardiogram/QTc Interval:    COORDINATION OF CARE:  Care Discussed with Consultants/Other Providers:

## 2020-04-16 NOTE — DISCHARGE NOTE PROVIDER - NSDCMRMEDTOKEN_GEN_ALL_CORE_FT
Bystolic 20 mg oral tablet: 1 tab(s) orally once a day Bystolic 20 mg oral tablet: 1 tab(s) orally once a day  Procardia XL 30 mg oral tablet, extended release: 1 tab(s) orally once a day   Xarelto 10 mg oral tablet: 1 tab(s) orally once a day

## 2020-04-16 NOTE — DISCHARGE NOTE PROVIDER - HOSPITAL COURSE
63 yo M with HTN presents with fevers and myalgias. COVID+. Plaquenil course now finished. Original symptoms of fever, myalgias, diarrhea, and shortness of breath have now resolved.  Remains on prophylaxis subcutaenous lovenox injections. 63 yo M with HTN presents with fevers and myalgias on 4/2/2020, found to be PCR confirmed COVID+.  Completed course of plaquinil 04/02-04/07. Required supplemental oxygen, which was tapered throughout the hospital course. Persistent oxygen requirements and elevated D-dimer prompted CT angiogram chest 4/19, which was negative for pulmonary embolism. Remains on prophylaxis subcutaenous lovenox injections and would benefit from continued outpatient anticoagulation. Patient also restarted on home dose antihypertensive nifedipine and initiated on carvedilol for blood pressure control. Original symptoms of fever, myalgias, diarrhea, and shortness of breath have  esolved.  Patient is discharged in stable condition breathing comfortably on room air.

## 2020-04-16 NOTE — PROGRESS NOTE ADULT - ATTENDING COMMENTS
anticipate d/c in 24-48 hours    I updated Tanvir today  wife Tanvir [phone] and son Eleuterio Trinh [phone]

## 2020-04-17 LAB — GLUCOSE BLDC GLUCOMTR-MCNC: 166 MG/DL — HIGH (ref 70–99)

## 2020-04-17 PROCEDURE — 99233 SBSQ HOSP IP/OBS HIGH 50: CPT

## 2020-04-17 RX ADMIN — CARVEDILOL PHOSPHATE 25 MILLIGRAM(S): 80 CAPSULE, EXTENDED RELEASE ORAL at 04:27

## 2020-04-17 RX ADMIN — CARVEDILOL PHOSPHATE 25 MILLIGRAM(S): 80 CAPSULE, EXTENDED RELEASE ORAL at 16:32

## 2020-04-17 RX ADMIN — ENOXAPARIN SODIUM 40 MILLIGRAM(S): 100 INJECTION SUBCUTANEOUS at 09:29

## 2020-04-17 RX ADMIN — Medication 30 MILLIGRAM(S): at 04:27

## 2020-04-17 NOTE — PROGRESS NOTE ADULT - SUBJECTIVE AND OBJECTIVE BOX
[facility] Division of Hospital Medicine  Radha KnightDO  Pager (ROSEMARIEF, 7E-2I): [phone]  Other Times:  [phone]    Patient is a 64y old  Male who presents with a chief complaint of hypoxia (16 Apr 2020 14:45)    SUBJECTIVE / OVERNIGHT EVENTS:  Patient seen and examined.     ADDITIONAL REVIEW OF SYSTEMS:  Per HPI, otherwise negative    MEDICATIONS  (STANDING):  carvedilol 25 milliGRAM(s) Oral every 12 hours  enoxaparin Injectable 40 milliGRAM(s) SubCutaneous daily  NIFEdipine XL 30 milliGRAM(s) Oral daily    MEDICATIONS  (PRN):  acetaminophen   Tablet .. 650 milliGRAM(s) Oral every 4 hours PRN Temp greater or equal to 38.5C (101.3F)  benzocaine 15 mG/menthol 3.6 mG (Sugar-Free) Lozenge 1 Lozenge Oral three times a day PRN Sore Throat  sodium chloride 0.65% Nasal 1 Spray(s) Both Nostrils four times a day PRN Nasal Congestion    I&O's Summary    17 Apr 2020 07:01  -  17 Apr 2020 10:15  --------------------------------------------------------  IN: 240 mL / OUT: 200 mL / NET: 40 mL    PHYSICAL EXAM:  Vital Signs Last 24 Hrs  T(C): 36.9 (17 Apr 2020 04:59), Max: 37.3 (16 Apr 2020 20:37)  T(F): 98.4 (17 Apr 2020 04:59), Max: 99.1 (16 Apr 2020 20:37)  HR: 84 (17 Apr 2020 08:49) (84 - 96)  BP: 112/77 (17 Apr 2020 08:49) (110/73 - 147/84)  BP(mean): --  RR: 19 (17 Apr 2020 09:32) (18 - 25)  SpO2: 96% (17 Apr 2020 09:32) (86% - 96%)    CONSTITUTIONAL: NAD, well-developed, well-groomed  EYES: PERRLA; conjunctiva and sclera clear  ENMT: Moist oral mucosa, no pharyngeal injection or exudates; normal dentition  NECK: Supple, no palpable masses; no thyromegaly  RESPIRATORY: Normal respiratory effort; lungs are clear to auscultation bilaterally  CARDIOVASCULAR: Regular rate and rhythm, normal S1 and S2, no murmur/rub/gallop; No lower extremity edema; Peripheral pulses are 2+ bilaterally  ABDOMEN: Nontender to palpation, normoactive bowel sounds, no rebound/guarding; No hepatosplenomegaly  MUSCULOSKELETAL:  Normal gait; no clubbing or cyanosis of digits; no joint swelling or tenderness to palpation  PSYCH: A+O to person, place, and time; affect appropriate  NEUROLOGY: CN 2-12 are intact and symmetric; no gross sensory deficits   SKIN: No rashes; no palpable lesions    LABS:                        13.2   9.39  )-----------( 286      ( 16 Apr 2020 07:11 )             40.1     04-16    135  |  99  |  20  ----------------------------<  125<H>  4.2   |  23  |  0.87    Ca    9.2      16 Apr 2020 07:10    TPro  8.0  /  Alb  3.1<L>  /  TBili  0.5  /  DBili  x   /  AST  24  /  ALT  56<H>  /  AlkPhos  58  04-16    RADIOLOGY & ADDITIONAL TESTS:  Results Reviewed:   Xray Chest 1 View AP/PA (04.02.20 @ 13:58)  IMPRESSION:     No focal consolidation or pleural effusion.    Electrocardiogram Personally Reviewed:  12 Lead ECG (04.04.20 @ 16:57)   QTC Calculation(Bezet) 442 ms    COORDINATION OF CARE:  Care Discussed with Consultants/Other Providers [Y/N]: Attending hospitalist

## 2020-04-17 NOTE — PROGRESS NOTE ADULT - ATTENDING COMMENTS
63yo M pmh HTN admitted 4/2 with acute hypoxic respiratory failure due to COVID completed plaquenil overall improving but still desaturates with ambulation. 4/17 desaturated to 89% with subjective sob.    Given age and ddimer elevation, would be candidate for postdischarge dvt ppx. d/w patient and he is agreeable to taking xarelto 10mg po as outpatient for 4 weeks. Repeat ddimer in am. If still elevated would d/c on dvt ppx as specified.    -J12.89: Other viral pneumonia, and  -B97.29 Other coronavirus as the cause of diseases classified elsewhere    CPT: 91871

## 2020-04-18 LAB
ANION GAP SERPL CALC-SCNC: 12 MMOL/L — SIGNIFICANT CHANGE UP (ref 5–17)
BUN SERPL-MCNC: 21 MG/DL — SIGNIFICANT CHANGE UP (ref 7–23)
CALCIUM SERPL-MCNC: 9.4 MG/DL — SIGNIFICANT CHANGE UP (ref 8.4–10.5)
CHLORIDE SERPL-SCNC: 98 MMOL/L — SIGNIFICANT CHANGE UP (ref 96–108)
CO2 SERPL-SCNC: 26 MMOL/L — SIGNIFICANT CHANGE UP (ref 22–31)
CREAT SERPL-MCNC: 1.01 MG/DL — SIGNIFICANT CHANGE UP (ref 0.5–1.3)
D DIMER BLD IA.RAPID-MCNC: 2251 NG/ML DDU — HIGH
GLUCOSE SERPL-MCNC: 120 MG/DL — HIGH (ref 70–99)
HCT VFR BLD CALC: 38.4 % — LOW (ref 39–50)
HGB BLD-MCNC: 12.6 G/DL — LOW (ref 13–17)
MCHC RBC-ENTMCNC: 30.1 PG — SIGNIFICANT CHANGE UP (ref 27–34)
MCHC RBC-ENTMCNC: 32.8 GM/DL — SIGNIFICANT CHANGE UP (ref 32–36)
MCV RBC AUTO: 91.6 FL — SIGNIFICANT CHANGE UP (ref 80–100)
NRBC # BLD: 0 /100 WBCS — SIGNIFICANT CHANGE UP (ref 0–0)
PLATELET # BLD AUTO: 333 K/UL — SIGNIFICANT CHANGE UP (ref 150–400)
POTASSIUM SERPL-MCNC: 4.3 MMOL/L — SIGNIFICANT CHANGE UP (ref 3.5–5.3)
POTASSIUM SERPL-SCNC: 4.3 MMOL/L — SIGNIFICANT CHANGE UP (ref 3.5–5.3)
RBC # BLD: 4.19 M/UL — LOW (ref 4.2–5.8)
RBC # FLD: 12.7 % — SIGNIFICANT CHANGE UP (ref 10.3–14.5)
SODIUM SERPL-SCNC: 136 MMOL/L — SIGNIFICANT CHANGE UP (ref 135–145)
WBC # BLD: 8.78 K/UL — SIGNIFICANT CHANGE UP (ref 3.8–10.5)
WBC # FLD AUTO: 8.78 K/UL — SIGNIFICANT CHANGE UP (ref 3.8–10.5)

## 2020-04-18 PROCEDURE — 99233 SBSQ HOSP IP/OBS HIGH 50: CPT | Mod: GC

## 2020-04-18 RX ORDER — POLYETHYLENE GLYCOL 3350 17 G/17G
17 POWDER, FOR SOLUTION ORAL DAILY
Refills: 0 | Status: DISCONTINUED | OUTPATIENT
Start: 2020-04-18 | End: 2020-04-27

## 2020-04-18 RX ADMIN — CARVEDILOL PHOSPHATE 25 MILLIGRAM(S): 80 CAPSULE, EXTENDED RELEASE ORAL at 04:18

## 2020-04-18 RX ADMIN — ENOXAPARIN SODIUM 40 MILLIGRAM(S): 100 INJECTION SUBCUTANEOUS at 13:47

## 2020-04-18 RX ADMIN — CARVEDILOL PHOSPHATE 25 MILLIGRAM(S): 80 CAPSULE, EXTENDED RELEASE ORAL at 13:46

## 2020-04-18 RX ADMIN — BENZOCAINE AND MENTHOL 1 LOZENGE: 5; 1 LIQUID ORAL at 04:18

## 2020-04-18 RX ADMIN — Medication 30 MILLIGRAM(S): at 04:18

## 2020-04-18 RX ADMIN — BENZOCAINE AND MENTHOL 1 LOZENGE: 5; 1 LIQUID ORAL at 22:38

## 2020-04-18 NOTE — PROGRESS NOTE ADULT - SUBJECTIVE AND OBJECTIVE BOX
[facility] Division of Hospital Medicine  Radha Knight   Pager (LISA, 3O-8Y): [phone]  Other Times:  [phone]    Patient is a 64y old  Male who presents with a chief complaint of hypoxia (16 Apr 2020 14:45)    SUBJECTIVE / OVERNIGHT EVENTS:  Patient seen and examined. Reports SOB when he sat up to try to eat breakfast. O2 sat lying down on 6LNC 96%, dropped to 89-90% sitting up. + Constipation.     ADDITIONAL REVIEW OF SYSTEMS:  Per HPI, otherwise negative    MEDICATIONS  (STANDING):  carvedilol 25 milliGRAM(s) Oral every 12 hours  enoxaparin Injectable 40 milliGRAM(s) SubCutaneous daily  NIFEdipine XL 30 milliGRAM(s) Oral daily    MEDICATIONS  (PRN):  acetaminophen   Tablet .. 650 milliGRAM(s) Oral every 4 hours PRN Temp greater or equal to 38.5C (101.3F)  benzocaine 15 mG/menthol 3.6 mG (Sugar-Free) Lozenge 1 Lozenge Oral three times a day PRN Sore Throat  sodium chloride 0.65% Nasal 1 Spray(s) Both Nostrils four times a day PRN Nasal Congestion    04-17-20 @ 07:01  -  04-18-20 @ 07:00  --------------------------------------------------------  IN: 480 mL / OUT: 850 mL / NET: -370 mL    04-18-20 @ 07:01  -  04-18-20 @ 10:20  --------------------------------------------------------  IN: 240 mL / OUT: 0 mL / NET: 240 mL    PHYSICAL EXAM:  Vital Signs Last 24 Hrs  T(C): 36.7 (18 Apr 2020 09:06), Max: 36.9 (18 Apr 2020 04:13)  T(F): 98 (18 Apr 2020 09:06), Max: 98.5 (18 Apr 2020 04:13)  HR: 94 (18 Apr 2020 09:06) (80 - 94)  BP: 117/77 (18 Apr 2020 09:06) (117/77 - 138/87)  BP(mean): --  RR: 21 (18 Apr 2020 09:06) (18 - 22)  SpO2: 93% (18 Apr 2020 09:06) (91% - 99%)    CONSTITUTIONAL: NAD, well-developed, well-groomed  EYES: PERRLA; conjunctiva and sclera clear  ENMT: Moist oral mucosa, no pharyngeal injection or exudates; normal dentition  NECK: Supple, no palpable masses; no thyromegaly  RESPIRATORY: Normal respiratory effort; lungs are clear to auscultation bilaterally  CARDIOVASCULAR: Regular rate and rhythm, normal S1 and S2, no murmur/rub/gallop; No lower extremity edema; Peripheral pulses are 2+ bilaterally  ABDOMEN: Nontender to palpation, normoactive bowel sounds, no rebound/guarding; No hepatosplenomegaly  MUSCULOSKELETAL:  Normal gait; no clubbing or cyanosis of digits; no joint swelling or tenderness to palpation  PSYCH: A+O to person, place, and time; affect appropriate  NEUROLOGY: CN 2-12 are intact and symmetric; no gross sensory deficits   SKIN: No rashes; no palpable lesions    LABS:                      12.6   8.78  )-----------( 333      ( 18 Apr 2020 06:42 )             38.4   04-18    136  |  98  |  21  ----------------------------<  120<H>  4.3   |  26  |  1.01    Ca    9.4      18 Apr 2020 06:42    D Dimer: 1148 (4/2) -> 3179 (4/10) -> 2072 (4/12) -> 1859 (4/14) -> 2685 (4/16) -> 2251 (4/18)    RADIOLOGY & ADDITIONAL TESTS:  Results Reviewed:   Xray Chest 1 View AP/PA (04.02.20 @ 13:58)  IMPRESSION:   No focal consolidation or pleural effusion.    Electrocardiogram Personally Reviewed:  12 Lead ECG (04.04.20 @ 16:57)   QTC Calculation(Bezet) 442 ms    COORDINATION OF CARE:  Care Discussed with Consultants/Other Providers [Y/N]: Attending hospitalist

## 2020-04-18 NOTE — PROGRESS NOTE ADULT - PROBLEM SELECTOR PLAN 1
Hypoxic resp failure due to COVID: symptoms 10 days prior to admission with fevers, myalgias.   - d-dimer persistently elevated: will consider CTA to r/o PE, may need xarelto upon discharge.   - Plaquenil completed 4/2-4/7  - Maintain on airborne isolation.  - Obtain daily 5 minute room air O2 saturations  - Acetaminophen 650 mg PO q4h PRN fever. Limit use of NSAIDs.

## 2020-04-19 PROCEDURE — 99233 SBSQ HOSP IP/OBS HIGH 50: CPT | Mod: GC

## 2020-04-19 PROCEDURE — 71275 CT ANGIOGRAPHY CHEST: CPT | Mod: 26

## 2020-04-19 RX ADMIN — Medication 30 MILLIGRAM(S): at 05:03

## 2020-04-19 RX ADMIN — CARVEDILOL PHOSPHATE 25 MILLIGRAM(S): 80 CAPSULE, EXTENDED RELEASE ORAL at 15:49

## 2020-04-19 RX ADMIN — CARVEDILOL PHOSPHATE 25 MILLIGRAM(S): 80 CAPSULE, EXTENDED RELEASE ORAL at 05:01

## 2020-04-19 RX ADMIN — ENOXAPARIN SODIUM 40 MILLIGRAM(S): 100 INJECTION SUBCUTANEOUS at 15:49

## 2020-04-19 NOTE — PROGRESS NOTE ADULT - SUBJECTIVE AND OBJECTIVE BOX
[facility] Division of Hospital Medicine  Radha Knight   Pager (LISA, 2W-3K): [phone]  Other Times:  [phone]    Patient is a 64y old  Male who presents with a chief complaint of hypoxia (16 Apr 2020 14:45)    SUBJECTIVE / OVERNIGHT EVENTS:  Patient seen and examined. Reports SOB when he sat up to try to eat breakfast. O2 sat lying down on 6LNC 96%, dropped to 89-90% sitting up. + Constipation.     ADDITIONAL REVIEW OF SYSTEMS:  Per HPI, otherwise negative    MEDICATIONS  (STANDING):  carvedilol 25 milliGRAM(s) Oral every 12 hours  enoxaparin Injectable 40 milliGRAM(s) SubCutaneous daily  NIFEdipine XL 30 milliGRAM(s) Oral daily  polyethylene glycol 3350 17 Gram(s) Oral daily    MEDICATIONS  (PRN):  acetaminophen   Tablet .. 650 milliGRAM(s) Oral every 4 hours PRN Temp greater or equal to 38.5C (101.3F)  benzocaine 15 mG/menthol 3.6 mG (Sugar-Free) Lozenge 1 Lozenge Oral three times a day PRN Sore Throat  sodium chloride 0.65% Nasal 1 Spray(s) Both Nostrils four times a day PRN Nasal Congestion    04-18-20 @ 07:01  -  04-19-20 @ 07:00  --------------------------------------------------------  IN: 990 mL / OUT: 900 mL / NET: 90 mL    PHYSICAL EXAM:  Vital Signs Last 24 Hrs  T(C): 36.8 (19 Apr 2020 04:36), Max: 37.1 (18 Apr 2020 10:59)  T(F): 98.3 (19 Apr 2020 04:36), Max: 98.8 (18 Apr 2020 10:59)  HR: 84 (19 Apr 2020 04:36) (82 - 94)  BP: 123/78 (19 Apr 2020 04:36) (112/74 - 129/79)  BP(mean): --  RR: 18 (19 Apr 2020 04:36) (18 - 21)  SpO2: 97% (19 Apr 2020 04:36) (93% - 98%)    CONSTITUTIONAL: NAD, well-developed, well-groomed  EYES: PERRLA; conjunctiva and sclera clear  ENMT: Moist oral mucosa, no pharyngeal injection or exudates; normal dentition  NECK: Supple, no palpable masses; no thyromegaly  RESPIRATORY: Normal respiratory effort; lungs are clear to auscultation bilaterally  CARDIOVASCULAR: Regular rate and rhythm, normal S1 and S2, no murmur/rub/gallop; No lower extremity edema; Peripheral pulses are 2+ bilaterally  ABDOMEN: Nontender to palpation, normoactive bowel sounds, no rebound/guarding; No hepatosplenomegaly  MUSCULOSKELETAL:  Normal gait; no clubbing or cyanosis of digits; no joint swelling or tenderness to palpation  PSYCH: A+O to person, place, and time; affect appropriate  NEUROLOGY: CN 2-12 are intact and symmetric; no gross sensory deficits   SKIN: No rashes; no palpable lesions    LABS:                      12.6   8.78  )-----------( 333      ( 18 Apr 2020 06:42 )             38.4   04-18    136  |  98  |  21  ----------------------------<  120<H>  4.3   |  26  |  1.01    Ca    9.4      18 Apr 2020 06:42    D Dimer: 1148 (4/2) -> 3179 (4/10) -> 2072 (4/12) -> 1859 (4/14) -> 2685 (4/16) -> 2251 (4/18)    RADIOLOGY & ADDITIONAL TESTS:  Results Reviewed:   Xray Chest 1 View AP/PA (04.02.20 @ 13:58)  IMPRESSION:   No focal consolidation or pleural effusion.    Electrocardiogram Personally Reviewed:  12 Lead ECG (04.04.20 @ 16:57)   QTC Calculation(Bezet) 442 ms    COORDINATION OF CARE:  Care Discussed with Consultants/Other Providers [Y/N]: Attending hospitalist [facility] Division of Hospital Medicine  Radha Knight   Pager ([place], 2S-5C): [phone]  Other Times:  [phone]    Patient is a 64y old  Male who presents with a chief complaint of hypoxia (16 Apr 2020 14:45)    SUBJECTIVE / OVERNIGHT EVENTS:  Patient seen and examined. Reports still SOB when he sits up to try to eat breakfast, o2 sat 88% on 5LNC.      ADDITIONAL REVIEW OF SYSTEMS:  Per HPI, otherwise negative    MEDICATIONS  (STANDING):  carvedilol 25 milliGRAM(s) Oral every 12 hours  enoxaparin Injectable 40 milliGRAM(s) SubCutaneous daily  NIFEdipine XL 30 milliGRAM(s) Oral daily  polyethylene glycol 3350 17 Gram(s) Oral daily    MEDICATIONS  (PRN):  acetaminophen   Tablet .. 650 milliGRAM(s) Oral every 4 hours PRN Temp greater or equal to 38.5C (101.3F)  benzocaine 15 mG/menthol 3.6 mG (Sugar-Free) Lozenge 1 Lozenge Oral three times a day PRN Sore Throat  sodium chloride 0.65% Nasal 1 Spray(s) Both Nostrils four times a day PRN Nasal Congestion    04-18-20 @ 07:01  -  04-19-20 @ 07:00  --------------------------------------------------------  IN: 990 mL / OUT: 900 mL / NET: 90 mL    PHYSICAL EXAM:  Vital Signs Last 24 Hrs  T(C): 36.8 (19 Apr 2020 04:36), Max: 37.1 (18 Apr 2020 10:59)  T(F): 98.3 (19 Apr 2020 04:36), Max: 98.8 (18 Apr 2020 10:59)  HR: 84 (19 Apr 2020 04:36) (82 - 94)  BP: 123/78 (19 Apr 2020 04:36) (112/74 - 129/79)  BP(mean): --  RR: 18 (19 Apr 2020 04:36) (18 - 21)  SpO2: 97% (19 Apr 2020 04:36) (93% - 98%)    CONSTITUTIONAL: NAD, well-developed, well-groomed  EYES: PERRLA; conjunctiva and sclera clear  ENMT: Moist oral mucosa, no pharyngeal injection or exudates; normal dentition  NECK: Supple, no palpable masses; no thyromegaly  RESPIRATORY: Normal respiratory effort; lungs are clear to auscultation bilaterally  CARDIOVASCULAR: Regular rate and rhythm, normal S1 and S2, no murmur/rub/gallop; No lower extremity edema; Peripheral pulses are 2+ bilaterally  ABDOMEN: Nontender to palpation, normoactive bowel sounds, no rebound/guarding; No hepatosplenomegaly  MUSCULOSKELETAL:  Normal gait; no clubbing or cyanosis of digits; no joint swelling or tenderness to palpation  PSYCH: A+O to person, place, and time; affect appropriate  NEUROLOGY: CN 2-12 are intact and symmetric; no gross sensory deficits   SKIN: No rashes; no palpable lesions    LABS:                      12.6   8.78  )-----------( 333      ( 18 Apr 2020 06:42 )             38.4   04-18    136  |  98  |  21  ----------------------------<  120<H>  4.3   |  26  |  1.01    Ca    9.4      18 Apr 2020 06:42    D Dimer: 1148 (4/2) -> 3179 (4/10) -> 2072 (4/12) -> 1859 (4/14) -> 2685 (4/16) -> 2251 (4/18)    RADIOLOGY & ADDITIONAL TESTS:  Results Reviewed:   Xray Chest 1 View AP/PA (04.02.20 @ 13:58)  IMPRESSION:   No focal consolidation or pleural effusion.    Electrocardiogram Personally Reviewed:  12 Lead ECG (04.04.20 @ 16:57)   QTC Calculation(Bezet) 442 ms    COORDINATION OF CARE:  Care Discussed with Consultants/Other Providers [Y/N]: Attending hospitalist

## 2020-04-19 NOTE — PROGRESS NOTE ADULT - PROBLEM SELECTOR PLAN 1
Hypoxic resp failure due to COVID: symptoms 10 days prior to admission with fevers, myalgias.   - d-dimer persistently elevated: will consider CTA to r/o PE, may need xarelto upon discharge.   - Plaquenil completed 4/2-4/7  - Maintain on airborne isolation.  - Obtain daily 5 minute room air O2 saturations  - Acetaminophen 650 mg PO q4h PRN fever. Limit use of NSAIDs. Hypoxic resp failure due to COVID: symptoms 10 days prior to admission with fevers, myalgias.   - Patient now approximately 4 weeks since symptom onset, had been weaned off O2 on 4/17 but now with worsening of hypoxia, elevated d-dimer. BB may mask tachycardia. Ordered CTA to r/o PE   - Plaquenil completed 4/2-4/7  - Maintain on airborne isolation.  - Obtain daily 5 minute room air O2 saturations  - Acetaminophen 650 mg PO q4h PRN fever. Limit use of NSAIDs.

## 2020-04-19 NOTE — PROGRESS NOTE ADULT - ATTENDING COMMENTS
Supplemental O2 requirements have increased slightly despite several weeks from onset of initial symptoms. HR 80-90's despite being on Coreg 25 mg. Will order CTA chest to r/o PE as possible cause.

## 2020-04-20 PROCEDURE — 99233 SBSQ HOSP IP/OBS HIGH 50: CPT

## 2020-04-20 RX ADMIN — CARVEDILOL PHOSPHATE 25 MILLIGRAM(S): 80 CAPSULE, EXTENDED RELEASE ORAL at 16:09

## 2020-04-20 RX ADMIN — Medication 30 MILLIGRAM(S): at 05:07

## 2020-04-20 RX ADMIN — CARVEDILOL PHOSPHATE 25 MILLIGRAM(S): 80 CAPSULE, EXTENDED RELEASE ORAL at 05:05

## 2020-04-20 RX ADMIN — ENOXAPARIN SODIUM 40 MILLIGRAM(S): 100 INJECTION SUBCUTANEOUS at 11:39

## 2020-04-20 NOTE — PROGRESS NOTE ADULT - PROBLEM SELECTOR PLAN 1
Hypoxic resp failure due to COVID: symptoms 10 days prior to admission with fevers, myalgias.   - Patient now approximately 4 weeks since symptom onset, had been weaned off O2 on 4/17 but now with persistent hypoxia, elevated d-dimer. CTA negative for PE   - Plaquenil completed 4/2-4/7  - Maintain on airborne isolation.  - Obtain daily 5 minute room air O2 saturations  - Acetaminophen 650 mg PO q4h PRN fever. Limit use of NSAIDs.

## 2020-04-20 NOTE — PROGRESS NOTE ADULT - SUBJECTIVE AND OBJECTIVE BOX
Medicine Progress Note    Patient is a 64y old  Male who presents with a chief complaint of hypoxia (19 Apr 2020 07:46)      SUBJECTIVE / OVERNIGHT EVENTS: Patient seen and examined at bedside - patient feels better. Feels good sitting but slightly more short of breath on walking.     ADDITIONAL REVIEW OF SYSTEMS: as above    MEDICATIONS  (STANDING):  carvedilol 25 milliGRAM(s) Oral every 12 hours  enoxaparin Injectable 40 milliGRAM(s) SubCutaneous daily  NIFEdipine XL 30 milliGRAM(s) Oral daily  polyethylene glycol 3350 17 Gram(s) Oral daily    MEDICATIONS  (PRN):  acetaminophen   Tablet .. 650 milliGRAM(s) Oral every 4 hours PRN Temp greater or equal to 38.5C (101.3F)  benzocaine 15 mG/menthol 3.6 mG (Sugar-Free) Lozenge 1 Lozenge Oral three times a day PRN Sore Throat  sodium chloride 0.65% Nasal 1 Spray(s) Both Nostrils four times a day PRN Nasal Congestion    CAPILLARY BLOOD GLUCOSE        I&O's Summary    19 Apr 2020 07:01  -  20 Apr 2020 07:00  --------------------------------------------------------  IN: 720 mL / OUT: 300 mL / NET: 420 mL    20 Apr 2020 07:01  -  20 Apr 2020 15:07  --------------------------------------------------------  IN: 480 mL / OUT: 0 mL / NET: 480 mL        PHYSICAL EXAM:  Vital Signs Last 24 Hrs  T(C): 36.7 (20 Apr 2020 10:37), Max: 36.8 (20 Apr 2020 04:21)  T(F): 98.1 (20 Apr 2020 10:37), Max: 98.3 (20 Apr 2020 04:21)  HR: 81 (20 Apr 2020 10:37) (81 - 90)  BP: 127/81 (20 Apr 2020 10:37) (122/80 - 135/86)  BP(mean): --  RR: 18 (20 Apr 2020 10:37) (18 - 18)  SpO2: 98% (20 Apr 2020 10:37) (97% - 98%)    CONSTITUTIONAL: NAD  ENMT: Moist oral mucosa, no pharyngeal injection or exudates; normal dentition  RESPIRATORY: Normal respiratory effort;  CARDIOVASCULAR: Regular rate and rhythm, no lower extremity edema; Peripheral pulses are 2+ bilaterally  ABDOMEN: Nontender to palpation  PSYCH: A+O to person, place, and time; affect appropriate  NEUROLOGY: no gross sensory deficits   SKIN: No rashes; no palpable lesions    LABS:                    COVID-19 PCR: Detected (02 Apr 2020 13:58)      RADIOLOGY & ADDITIONAL TESTS:  Imaging from Last 24 Hours:    Electrocardiogram/QTc Interval:    COORDINATION OF CARE:  Care Discussed with Consultants/Other Providers:

## 2020-04-20 NOTE — PROGRESS NOTE ADULT - ATTENDING COMMENTS
Supplemental O2 requirements stable. HR 80-90's despite being on Coreg 25 mg. CTA neg for PE. Wean oxygen. Supplemental O2 requirements stable. HR 80-90's despite being on Coreg 25 mg. CTA neg for PE. Wean oxygen.  Patient will likely benefit from extended dvt ppx after discharge. He said he would consider. Need to discuss tomorrow.

## 2020-04-21 PROCEDURE — 99233 SBSQ HOSP IP/OBS HIGH 50: CPT

## 2020-04-21 RX ORDER — RIVAROXABAN 15 MG-20MG
1 KIT ORAL
Qty: 28 | Refills: 0
Start: 2020-04-21 | End: 2020-05-18

## 2020-04-21 RX ORDER — NIFEDIPINE 30 MG
1 TABLET, EXTENDED RELEASE 24 HR ORAL
Qty: 30 | Refills: 0
Start: 2020-04-21 | End: 2020-05-20

## 2020-04-21 RX ORDER — NIFEDIPINE 30 MG
1 TABLET, EXTENDED RELEASE 24 HR ORAL
Qty: 0 | Refills: 0 | DISCHARGE
Start: 2020-04-21

## 2020-04-21 RX ADMIN — Medication 30 MILLIGRAM(S): at 05:09

## 2020-04-21 RX ADMIN — CARVEDILOL PHOSPHATE 25 MILLIGRAM(S): 80 CAPSULE, EXTENDED RELEASE ORAL at 05:09

## 2020-04-21 RX ADMIN — ENOXAPARIN SODIUM 40 MILLIGRAM(S): 100 INJECTION SUBCUTANEOUS at 12:54

## 2020-04-21 RX ADMIN — CARVEDILOL PHOSPHATE 25 MILLIGRAM(S): 80 CAPSULE, EXTENDED RELEASE ORAL at 18:05

## 2020-04-21 NOTE — PROGRESS NOTE ADULT - PROBLEM SELECTOR PLAN 3
lovenox 40 daily    IMPROVE VTE Individual Risk Assessment  [  ] Previous VTE                                                  3  [  ] Thrombophilia                                               2  [  ] Lower limb paralysis                                      2        (unable to hold up >15 seconds)    [  ] Current Cancer                                              2         (within 6 months)  [  ] Immobilization > 24 hrs                                1  [  ] ICU/CCU stay > 24 hours                              1  [x] Age > 60                                                      1    IMPROVE VTE Score 1    By age>60 and Ddimer> 2x ULN, consider extended thromboprophylaxis with lovenox 40 qd or rivaroxaban 10mg daily for 31-39 days post discharge once stabilized.    Discussed with patient continuing anticoagulation on discharge. Will rediscuss today.

## 2020-04-21 NOTE — PROGRESS NOTE ADULT - ATTENDING COMMENTS
65yo M pmh HTN admitted 4/2 with acute hypoxic respiratory failure due to COVID completed plaquenil overall improving but still desaturates with ambulation. Wean oxygen. Anticipate d/c in next 24-48 hours.  Patient is agreeable for extended dvt ppx (age + ddimer elevation). [facility] Not covered by insurance, however [facility] will provide 4 weeks for free. 63yo M pmh HTN admitted 4/2 with sepsis and acute hypoxic respiratory failure due to COVID completed plaquenil overall improving but still desaturates with ambulation. Wean oxygen. Anticipate d/c in next 24-48 hours.  Patient is agreeable for extended dvt ppx (age + ddimer elevation). [facility]to sent to [facility]. Not covered by insurance, however [facility] will provide 4 weeks for free.

## 2020-04-21 NOTE — CHART NOTE - NSCHARTNOTEFT_GEN_A_CORE
Nutrition Follow Up Note  Patient seen for: Nutrition follow up. Pt with HTN, admitted with acute hypoxic respiratory failure due to COVID 19.     Source: RN, RD attempted to reach out to pt via cell phone [phone], however pt did not answer-message left, Unable to conduct a face to face interview or nutrition-focused physical exam at this time due to limited contact restrictions related to their medical condition and isolation precautions.     Diet : DASH    Patient reports: Per RN no complaints of N+V, last BM noted 4/18-per RN pt with BM yesterday      PO intake : Per RN pt eating well, noted with % meal completion per flowsheets which pt feels is accurate.        Daily: no new wt   % Weight Change    Pertinent Medications: MEDICATIONS  (STANDING):  carvedilol 25 milliGRAM(s) Oral every 12 hours  enoxaparin Injectable 40 milliGRAM(s) SubCutaneous daily  NIFEdipine XL 30 milliGRAM(s) Oral daily  polyethylene glycol 3350 17 Gram(s) Oral daily    MEDICATIONS  (PRN):  acetaminophen   Tablet .. 650 milliGRAM(s) Oral every 4 hours PRN Temp greater or equal to 38.5C (101.3F)  benzocaine 15 mG/menthol 3.6 mG (Sugar-Free) Lozenge 1 Lozenge Oral three times a day PRN Sore Throat  sodium chloride 0.65% Nasal 1 Spray(s) Both Nostrils four times a day PRN Nasal Congestion    Pertinent Labs: reviewed   Finger Sticks: none       Skin per nursing documentation: free of pressure injury   Edema: none     Estimated Needs:   [ x] no change since previous assessment  [ ] recalculated:     Previous Nutrition Diagnosis: Inadequate oral intake  Nutrition Diagnosis is: ongoing, addressed with oral diet and improving appetite     New Nutrition Diagnosis:  Related to:    As evidenced by:      Interventions:     Recommend  1) Continue DASH diet as ordered  2) Continue to encourage po intake and obtain/honor food preferences as able     Monitoring and Evaluation:     Continue to monitor Nutritional intake, Tolerance to diet prescription, weights, labs, skin integrity    RD remains available upon request and will follow up per protocol      Joslyn Norris R.D., [facility], Pager #[phone]

## 2020-04-21 NOTE — PROGRESS NOTE ADULT - PROBLEM SELECTOR PLAN 2
continue coreg.  Better controlled with addition of procardia. Discussed continuing both medications on discharge.

## 2020-04-21 NOTE — PROGRESS NOTE ADULT - PROBLEM SELECTOR PLAN 1
Hypoxic resp failure due to COVID: symptoms 10 days prior to admission with fevers, myalgias.   - Patient now approximately 4 weeks since symptom onset, had been weaned off O2 on 4/17 but now with persistent hypoxia, elevated d-dimer. CTA negative for PE   - Plaquenil completed 4/2-4/7  - Acetaminophen 650 mg PO q4h PRN fever. Limit use of NSAIDs.  - Maintain on airborne isolation.  - Weaned to room air at rest. Will attempt ambulation trial today.   - Dispo: Pending ambulation trial. Likely today or next 24 hrs.

## 2020-04-22 LAB
A1C WITH ESTIMATED AVERAGE GLUCOSE RESULT: 8 % — HIGH (ref 4–5.6)
ALBUMIN SERPL ELPH-MCNC: 3.3 G/DL — SIGNIFICANT CHANGE UP (ref 3.3–5)
ALP SERPL-CCNC: 56 U/L — SIGNIFICANT CHANGE UP (ref 40–120)
ALT FLD-CCNC: 44 U/L — SIGNIFICANT CHANGE UP (ref 10–45)
ANION GAP SERPL CALC-SCNC: 12 MMOL/L — SIGNIFICANT CHANGE UP (ref 5–17)
AST SERPL-CCNC: 23 U/L — SIGNIFICANT CHANGE UP (ref 10–40)
BILIRUB SERPL-MCNC: 0.4 MG/DL — SIGNIFICANT CHANGE UP (ref 0.2–1.2)
BUN SERPL-MCNC: 16 MG/DL — SIGNIFICANT CHANGE UP (ref 7–23)
CALCIUM SERPL-MCNC: 9.2 MG/DL — SIGNIFICANT CHANGE UP (ref 8.4–10.5)
CHLORIDE SERPL-SCNC: 99 MMOL/L — SIGNIFICANT CHANGE UP (ref 96–108)
CO2 SERPL-SCNC: 26 MMOL/L — SIGNIFICANT CHANGE UP (ref 22–31)
CREAT SERPL-MCNC: 0.94 MG/DL — SIGNIFICANT CHANGE UP (ref 0.5–1.3)
ESTIMATED AVERAGE GLUCOSE: 183 MG/DL — HIGH (ref 68–114)
GLUCOSE SERPL-MCNC: 135 MG/DL — HIGH (ref 70–99)
HCT VFR BLD CALC: 36.6 % — LOW (ref 39–50)
HGB BLD-MCNC: 12 G/DL — LOW (ref 13–17)
MCHC RBC-ENTMCNC: 30 PG — SIGNIFICANT CHANGE UP (ref 27–34)
MCHC RBC-ENTMCNC: 32.8 GM/DL — SIGNIFICANT CHANGE UP (ref 32–36)
MCV RBC AUTO: 91.5 FL — SIGNIFICANT CHANGE UP (ref 80–100)
NRBC # BLD: 0 /100 WBCS — SIGNIFICANT CHANGE UP (ref 0–0)
PLATELET # BLD AUTO: 242 K/UL — SIGNIFICANT CHANGE UP (ref 150–400)
POTASSIUM SERPL-MCNC: 4.1 MMOL/L — SIGNIFICANT CHANGE UP (ref 3.5–5.3)
POTASSIUM SERPL-SCNC: 4.1 MMOL/L — SIGNIFICANT CHANGE UP (ref 3.5–5.3)
PROT SERPL-MCNC: 7.4 G/DL — SIGNIFICANT CHANGE UP (ref 6–8.3)
RBC # BLD: 4 M/UL — LOW (ref 4.2–5.8)
RBC # FLD: 13.1 % — SIGNIFICANT CHANGE UP (ref 10.3–14.5)
SODIUM SERPL-SCNC: 137 MMOL/L — SIGNIFICANT CHANGE UP (ref 135–145)
WBC # BLD: 6.82 K/UL — SIGNIFICANT CHANGE UP (ref 3.8–10.5)
WBC # FLD AUTO: 6.82 K/UL — SIGNIFICANT CHANGE UP (ref 3.8–10.5)

## 2020-04-22 PROCEDURE — 99233 SBSQ HOSP IP/OBS HIGH 50: CPT

## 2020-04-22 RX ADMIN — POLYETHYLENE GLYCOL 3350 17 GRAM(S): 17 POWDER, FOR SOLUTION ORAL at 13:07

## 2020-04-22 RX ADMIN — Medication 30 MILLIGRAM(S): at 05:48

## 2020-04-22 RX ADMIN — CARVEDILOL PHOSPHATE 25 MILLIGRAM(S): 80 CAPSULE, EXTENDED RELEASE ORAL at 17:45

## 2020-04-22 RX ADMIN — ENOXAPARIN SODIUM 40 MILLIGRAM(S): 100 INJECTION SUBCUTANEOUS at 13:07

## 2020-04-22 RX ADMIN — CARVEDILOL PHOSPHATE 25 MILLIGRAM(S): 80 CAPSULE, EXTENDED RELEASE ORAL at 05:48

## 2020-04-22 NOTE — PROGRESS NOTE ADULT - PROBLEM SELECTOR PLAN 3
Lovenox 40 mg sq daily    IMPROVE VTE Score 1  By age>60 and Ddimer> 2x ULN will provide extended thromboprophylaxis. Patient is agreeable - Xarelto sent to [facility] - not covered by insurance, however [facility] will provide 4 weeks for free.     D/C home once O2 sats stable with ambulation.

## 2020-04-22 NOTE — PROGRESS NOTE ADULT - SUBJECTIVE AND OBJECTIVE BOX
Harvey Vazquez MD  Pager [phone]  Spectra [phone]  Cell Phone [phone]    Patient is a 64y old  Male who presents with a chief complaint of hypoxia (21 Apr 2020 09:17)        SUBJECTIVE / OVERNIGHT EVENTS: Patient resting comfortably. Still has dyspnea with exertion       MEDICATIONS  (STANDING):  carvedilol 25 milliGRAM(s) Oral every 12 hours  enoxaparin Injectable 40 milliGRAM(s) SubCutaneous daily  NIFEdipine XL 30 milliGRAM(s) Oral daily  polyethylene glycol 3350 17 Gram(s) Oral daily    MEDICATIONS  (PRN):  acetaminophen   Tablet .. 650 milliGRAM(s) Oral every 4 hours PRN Temp greater or equal to 38.5C (101.3F)  benzocaine 15 mG/menthol 3.6 mG (Sugar-Free) Lozenge 1 Lozenge Oral three times a day PRN Sore Throat  sodium chloride 0.65% Nasal 1 Spray(s) Both Nostrils four times a day PRN Nasal Congestion      Vital Signs Last 24 Hrs  T(C): 36.6 (22 Apr 2020 08:37), Max: 37.1 (21 Apr 2020 18:00)  T(F): 97.9 (22 Apr 2020 08:37), Max: 98.7 (21 Apr 2020 18:00)  HR: 80 (22 Apr 2020 08:37) (80 - 105)  BP: 113/73 (22 Apr 2020 08:37) (113/73 - 140/86)  BP(mean): --  RR: 20 (22 Apr 2020 08:37) (19 - 20)  SpO2: 95% (22 Apr 2020 08:37) (88% - 98%)  CAPILLARY BLOOD GLUCOSE        I&O's Summary    21 Apr 2020 07:01  -  22 Apr 2020 07:00  --------------------------------------------------------  IN: 840 mL / OUT: 1100 mL / NET: -260 mL    22 Apr 2020 07:01  -  22 Apr 2020 14:18  --------------------------------------------------------  IN: 500 mL / OUT: 300 mL / NET: 200 mL          PHYSICAL EXAM  GENERAL: NAD, well-developed  HEAD:  Atraumatic, Normocephalic  EYES: EOMI, PERRLA, conjunctiva and sclera clear  CHEST/LUNG: No respiratory distress  HEART: Regular rate and rhythm; No murmurs, rubs, or gallops  ABDOMEN: Soft, Nontender, Nondistended; Bowel sounds present  EXTREMITIES:  2+ Peripheral Pulses, No clubbing, cyanosis, or edema  PSYCH: AAOx3      LABS:                        12.0   6.82  )-----------( 242      ( 22 Apr 2020 06:26 )             36.6     04-22    137  |  99  |  16  ----------------------------<  135<H>  4.1   |  26  |  0.94    Ca    9.2      22 Apr 2020 06:26    TPro  7.4  /  Alb  3.3  /  TBili  0.4  /  DBili  x   /  AST  23  /  ALT  44  /  AlkPhos  56  04-22                RADIOLOGY & ADDITIONAL TESTS:    Imaging Personally Reviewed:  Consultant(s) Notes Reviewed:    Care Discussed with Consultants/Other Providers:

## 2020-04-22 NOTE — PROGRESS NOTE ADULT - PROBLEM SELECTOR PLAN 1
Hypoxic resp failure due to COVID: symptoms 10 days prior to admission with fevers, myalgias, now 4 weeks since symptom onset  Plaquenil completed 4/2-4/7  CTA neg for PE  Repeat ambulatory O2 sat on RA

## 2020-04-23 DIAGNOSIS — R73.9 HYPERGLYCEMIA, UNSPECIFIED: ICD-10-CM

## 2020-04-23 LAB
GLUCOSE BLDC GLUCOMTR-MCNC: 116 MG/DL — HIGH (ref 70–99)
GLUCOSE BLDC GLUCOMTR-MCNC: 124 MG/DL — HIGH (ref 70–99)
GLUCOSE BLDC GLUCOMTR-MCNC: 213 MG/DL — HIGH (ref 70–99)

## 2020-04-23 PROCEDURE — 99233 SBSQ HOSP IP/OBS HIGH 50: CPT

## 2020-04-23 RX ORDER — DEXTROSE 50 % IN WATER 50 %
15 SYRINGE (ML) INTRAVENOUS ONCE
Refills: 0 | Status: DISCONTINUED | OUTPATIENT
Start: 2020-04-23 | End: 2020-04-27

## 2020-04-23 RX ORDER — DEXTROSE 50 % IN WATER 50 %
12.5 SYRINGE (ML) INTRAVENOUS ONCE
Refills: 0 | Status: DISCONTINUED | OUTPATIENT
Start: 2020-04-23 | End: 2020-04-27

## 2020-04-23 RX ORDER — GLUCAGON INJECTION, SOLUTION 0.5 MG/.1ML
1 INJECTION, SOLUTION SUBCUTANEOUS ONCE
Refills: 0 | Status: DISCONTINUED | OUTPATIENT
Start: 2020-04-23 | End: 2020-04-27

## 2020-04-23 RX ORDER — DEXTROSE 50 % IN WATER 50 %
25 SYRINGE (ML) INTRAVENOUS ONCE
Refills: 0 | Status: DISCONTINUED | OUTPATIENT
Start: 2020-04-23 | End: 2020-04-27

## 2020-04-23 RX ORDER — INSULIN LISPRO 100/ML
VIAL (ML) SUBCUTANEOUS
Refills: 0 | Status: DISCONTINUED | OUTPATIENT
Start: 2020-04-23 | End: 2020-04-27

## 2020-04-23 RX ORDER — SODIUM CHLORIDE 9 MG/ML
1000 INJECTION, SOLUTION INTRAVENOUS
Refills: 0 | Status: DISCONTINUED | OUTPATIENT
Start: 2020-04-23 | End: 2020-04-27

## 2020-04-23 RX ORDER — INSULIN LISPRO 100/ML
2 VIAL (ML) SUBCUTANEOUS ONCE
Refills: 0 | Status: COMPLETED | OUTPATIENT
Start: 2020-04-23 | End: 2020-04-23

## 2020-04-23 RX ORDER — INSULIN LISPRO 100/ML
VIAL (ML) SUBCUTANEOUS AT BEDTIME
Refills: 0 | Status: DISCONTINUED | OUTPATIENT
Start: 2020-04-23 | End: 2020-04-27

## 2020-04-23 RX ADMIN — BENZOCAINE AND MENTHOL 1 LOZENGE: 5; 1 LIQUID ORAL at 17:37

## 2020-04-23 RX ADMIN — CARVEDILOL PHOSPHATE 25 MILLIGRAM(S): 80 CAPSULE, EXTENDED RELEASE ORAL at 05:47

## 2020-04-23 RX ADMIN — POLYETHYLENE GLYCOL 3350 17 GRAM(S): 17 POWDER, FOR SOLUTION ORAL at 13:01

## 2020-04-23 RX ADMIN — BENZOCAINE AND MENTHOL 1 LOZENGE: 5; 1 LIQUID ORAL at 06:19

## 2020-04-23 RX ADMIN — CARVEDILOL PHOSPHATE 25 MILLIGRAM(S): 80 CAPSULE, EXTENDED RELEASE ORAL at 17:36

## 2020-04-23 RX ADMIN — Medication 30 MILLIGRAM(S): at 05:47

## 2020-04-23 RX ADMIN — Medication 2 UNIT(S): at 13:24

## 2020-04-23 RX ADMIN — ENOXAPARIN SODIUM 40 MILLIGRAM(S): 100 INJECTION SUBCUTANEOUS at 13:01

## 2020-04-23 NOTE — PROGRESS NOTE ADULT - SUBJECTIVE AND OBJECTIVE BOX
Harvey Vazquez MD  Pager [phone]  Spectra [phone]  Cell Phone [phone]    Patient is a 64y old  Male who presents with a chief complaint of hypoxia (22 Apr 2020 14:18)        SUBJECTIVE / OVERNIGHT EVENTS:      MEDICATIONS  (STANDING):  carvedilol 25 milliGRAM(s) Oral every 12 hours  enoxaparin Injectable 40 milliGRAM(s) SubCutaneous daily  NIFEdipine XL 30 milliGRAM(s) Oral daily  polyethylene glycol 3350 17 Gram(s) Oral daily    MEDICATIONS  (PRN):  acetaminophen   Tablet .. 650 milliGRAM(s) Oral every 4 hours PRN Temp greater or equal to 38.5C (101.3F)  benzocaine 15 mG/menthol 3.6 mG (Sugar-Free) Lozenge 1 Lozenge Oral three times a day PRN Sore Throat  sodium chloride 0.65% Nasal 1 Spray(s) Both Nostrils four times a day PRN Nasal Congestion      Vital Signs Last 24 Hrs  T(C): 37 (23 Apr 2020 08:30), Max: 37.1 (23 Apr 2020 04:49)  T(F): 98.6 (23 Apr 2020 08:30), Max: 98.8 (23 Apr 2020 04:49)  HR: 86 (23 Apr 2020 08:30) (84 - 91)  BP: 109/70 (23 Apr 2020 08:30) (109/70 - 127/77)  BP(mean): --  RR: 20 (23 Apr 2020 08:30) (20 - 20)  SpO2: 92% (23 Apr 2020 08:30) (89% - 98%)  CAPILLARY BLOOD GLUCOSE        I&O's Summary    22 Apr 2020 07:01  -  23 Apr 2020 07:00  --------------------------------------------------------  IN: 760 mL / OUT: 800 mL / NET: -40 mL          PHYSICAL EXAM  GENERAL: NAD, well-developed  HEAD:  Atraumatic, Normocephalic  EYES: EOMI, PERRLA, conjunctiva and sclera clear  CHEST/LUNG: No respiratory distress  HEART: Regular rate and rhythm; No murmurs, rubs, or gallops  ABDOMEN: Soft, Nontender, Nondistended; Bowel sounds present  EXTREMITIES:  2+ Peripheral Pulses, No clubbing, cyanosis, or edema  PSYCH: AAOx3      LABS:                        12.0   6.82  )-----------( 242      ( 22 Apr 2020 06:26 )             36.6     04-22    137  |  99  |  16  ----------------------------<  135<H>  4.1   |  26  |  0.94    Ca    9.2      22 Apr 2020 06:26    TPro  7.4  /  Alb  3.3  /  TBili  0.4  /  DBili  x   /  AST  23  /  ALT  44  /  AlkPhos  56  04-22                RADIOLOGY & ADDITIONAL TESTS:    Imaging Personally Reviewed:  Consultant(s) Notes Reviewed:    Care Discussed with Consultants/Other Providers:

## 2020-04-23 NOTE — PROGRESS NOTE ADULT - PROBLEM SELECTOR PLAN 1
Hypoxic resp failure due to COVID: symptoms 10 days prior to admission with fevers, myalgias, now 4 weeks since symptom onset  Plaquenil completed 4/2-4/7  CTA neg for PE  Repeat ambulatory O2 sat on RA 87% today

## 2020-04-23 NOTE — PROGRESS NOTE ADULT - PROBLEM SELECTOR PLAN 4
Lovenox 40 mg sq daily    IMPROVE VTE Score 1  By age>60 and Ddimer> 2x ULN will provide extended thromboprophylaxis. Patient is agreeable - Xarelto sent to [facility] - not covered by insurance, however [facility] will provide 4 weeks for free.     D/C home once O2 sats stable with ambulation, hopefully in next 24-48 hours

## 2020-04-24 LAB
ANION GAP SERPL CALC-SCNC: 11 MMOL/L — SIGNIFICANT CHANGE UP (ref 5–17)
BASOPHILS # BLD AUTO: 0.03 K/UL — SIGNIFICANT CHANGE UP (ref 0–0.2)
BASOPHILS NFR BLD AUTO: 0.4 % — SIGNIFICANT CHANGE UP (ref 0–2)
BUN SERPL-MCNC: 15 MG/DL — SIGNIFICANT CHANGE UP (ref 7–23)
CALCIUM SERPL-MCNC: 9.4 MG/DL — SIGNIFICANT CHANGE UP (ref 8.4–10.5)
CHLORIDE SERPL-SCNC: 98 MMOL/L — SIGNIFICANT CHANGE UP (ref 96–108)
CO2 SERPL-SCNC: 27 MMOL/L — SIGNIFICANT CHANGE UP (ref 22–31)
CREAT SERPL-MCNC: 0.94 MG/DL — SIGNIFICANT CHANGE UP (ref 0.5–1.3)
EOSINOPHIL # BLD AUTO: 0.25 K/UL — SIGNIFICANT CHANGE UP (ref 0–0.5)
EOSINOPHIL NFR BLD AUTO: 3.6 % — SIGNIFICANT CHANGE UP (ref 0–6)
GLUCOSE BLDC GLUCOMTR-MCNC: 125 MG/DL — HIGH (ref 70–99)
GLUCOSE BLDC GLUCOMTR-MCNC: 150 MG/DL — HIGH (ref 70–99)
GLUCOSE BLDC GLUCOMTR-MCNC: 189 MG/DL — HIGH (ref 70–99)
GLUCOSE BLDC GLUCOMTR-MCNC: 84 MG/DL — SIGNIFICANT CHANGE UP (ref 70–99)
GLUCOSE SERPL-MCNC: 103 MG/DL — HIGH (ref 70–99)
HCT VFR BLD CALC: 34.1 % — LOW (ref 39–50)
HGB BLD-MCNC: 11.3 G/DL — LOW (ref 13–17)
IMM GRANULOCYTES NFR BLD AUTO: 1 % — SIGNIFICANT CHANGE UP (ref 0–1.5)
LYMPHOCYTES # BLD AUTO: 1.19 K/UL — SIGNIFICANT CHANGE UP (ref 1–3.3)
LYMPHOCYTES # BLD AUTO: 17.2 % — SIGNIFICANT CHANGE UP (ref 13–44)
MAGNESIUM SERPL-MCNC: 2.1 MG/DL — SIGNIFICANT CHANGE UP (ref 1.6–2.6)
MCHC RBC-ENTMCNC: 30.5 PG — SIGNIFICANT CHANGE UP (ref 27–34)
MCHC RBC-ENTMCNC: 33.1 GM/DL — SIGNIFICANT CHANGE UP (ref 32–36)
MCV RBC AUTO: 91.9 FL — SIGNIFICANT CHANGE UP (ref 80–100)
MONOCYTES # BLD AUTO: 0.92 K/UL — HIGH (ref 0–0.9)
MONOCYTES NFR BLD AUTO: 13.3 % — SIGNIFICANT CHANGE UP (ref 2–14)
NEUTROPHILS # BLD AUTO: 4.46 K/UL — SIGNIFICANT CHANGE UP (ref 1.8–7.4)
NEUTROPHILS NFR BLD AUTO: 64.5 % — SIGNIFICANT CHANGE UP (ref 43–77)
NRBC # BLD: 0 /100 WBCS — SIGNIFICANT CHANGE UP (ref 0–0)
PHOSPHATE SERPL-MCNC: 3.9 MG/DL — SIGNIFICANT CHANGE UP (ref 2.5–4.5)
PLATELET # BLD AUTO: 220 K/UL — SIGNIFICANT CHANGE UP (ref 150–400)
POTASSIUM SERPL-MCNC: 4.2 MMOL/L — SIGNIFICANT CHANGE UP (ref 3.5–5.3)
POTASSIUM SERPL-SCNC: 4.2 MMOL/L — SIGNIFICANT CHANGE UP (ref 3.5–5.3)
RBC # BLD: 3.71 M/UL — LOW (ref 4.2–5.8)
RBC # FLD: 13.3 % — SIGNIFICANT CHANGE UP (ref 10.3–14.5)
SODIUM SERPL-SCNC: 136 MMOL/L — SIGNIFICANT CHANGE UP (ref 135–145)
WBC # BLD: 6.92 K/UL — SIGNIFICANT CHANGE UP (ref 3.8–10.5)
WBC # FLD AUTO: 6.92 K/UL — SIGNIFICANT CHANGE UP (ref 3.8–10.5)

## 2020-04-24 PROCEDURE — 99233 SBSQ HOSP IP/OBS HIGH 50: CPT

## 2020-04-24 RX ADMIN — POLYETHYLENE GLYCOL 3350 17 GRAM(S): 17 POWDER, FOR SOLUTION ORAL at 12:13

## 2020-04-24 RX ADMIN — CARVEDILOL PHOSPHATE 25 MILLIGRAM(S): 80 CAPSULE, EXTENDED RELEASE ORAL at 05:43

## 2020-04-24 RX ADMIN — ENOXAPARIN SODIUM 40 MILLIGRAM(S): 100 INJECTION SUBCUTANEOUS at 12:14

## 2020-04-24 RX ADMIN — CARVEDILOL PHOSPHATE 25 MILLIGRAM(S): 80 CAPSULE, EXTENDED RELEASE ORAL at 17:36

## 2020-04-24 RX ADMIN — Medication 1: at 12:13

## 2020-04-24 RX ADMIN — Medication 30 MILLIGRAM(S): at 05:43

## 2020-04-24 NOTE — PROGRESS NOTE ADULT - PROBLEM SELECTOR PLAN 4
Lovenox 40 mg sq daily    IMPROVE VTE Score 1  By age>60 and Ddimer> 2x ULN will provide extended thromboprophylaxis. Patient is agreeable - Xarelto sent to [facility] - not covered by insurance, however [facility] will provide 4 weeks for free.     Patient continues to have an SpO2 86-87% when ambulating on RA. Revisited possibility of discharge with home O2- patient is very hesitant. Plan continues to be for D/C home once O2 sats acceptable with ambulation on RA.

## 2020-04-24 NOTE — PROGRESS NOTE ADULT - PROBLEM SELECTOR PLAN 1
Hypoxic resp failure due to COVID: symptoms 10 days prior to admission with fevers, myalgias, multiple weeks since symptom onset  Plaquenil completed 4/2-4/7  CTA neg for PE  Repeated ambulatory O2 sat on RA: 86% today

## 2020-04-24 NOTE — PROGRESS NOTE ADULT - SUBJECTIVE AND OBJECTIVE BOX
Harvey Vazquez MD  Pager [phone]  Spectra [phone]  Cell Phone [phone]    Patient is a 64y old  Male who presents with a chief complaint of hypoxia (23 Apr 2020 11:25)        SUBJECTIVE / OVERNIGHT EVENTS: Patient states his appetite was poor this am. Still has dyspnea with exertion.      MEDICATIONS  (STANDING):  carvedilol 25 milliGRAM(s) Oral every 12 hours  dextrose 5%. 1000 milliLiter(s) (50 mL/Hr) IV Continuous <Continuous>  dextrose 50% Injectable 12.5 Gram(s) IV Push once  dextrose 50% Injectable 25 Gram(s) IV Push once  dextrose 50% Injectable 25 Gram(s) IV Push once  enoxaparin Injectable 40 milliGRAM(s) SubCutaneous daily  insulin lispro (HumaLOG) corrective regimen sliding scale   SubCutaneous three times a day before meals  insulin lispro (HumaLOG) corrective regimen sliding scale   SubCutaneous at bedtime  NIFEdipine XL 30 milliGRAM(s) Oral daily  polyethylene glycol 3350 17 Gram(s) Oral daily    MEDICATIONS  (PRN):  acetaminophen   Tablet .. 650 milliGRAM(s) Oral every 4 hours PRN Temp greater or equal to 38.5C (101.3F)  benzocaine 15 mG/menthol 3.6 mG (Sugar-Free) Lozenge 1 Lozenge Oral three times a day PRN Sore Throat  dextrose 40% Gel 15 Gram(s) Oral once PRN Blood Glucose LESS THAN 70 milliGRAM(s)/deciliter  glucagon  Injectable 1 milliGRAM(s) IntraMuscular once PRN Glucose LESS THAN 70 milligrams/deciliter  sodium chloride 0.65% Nasal 1 Spray(s) Both Nostrils four times a day PRN Nasal Congestion      Vital Signs Last 24 Hrs  T(C): 36.7 (24 Apr 2020 08:16), Max: 37.2 (23 Apr 2020 18:04)  T(F): 98 (24 Apr 2020 08:16), Max: 99 (24 Apr 2020 04:53)  HR: 92 (24 Apr 2020 08:16) (83 - 92)  BP: 129/80 (24 Apr 2020 08:16) (129/78 - 131/77)  BP(mean): --  RR: 20 (24 Apr 2020 08:16) (20 - 20)  SpO2: 96% (24 Apr 2020 08:16) (96% - 98%)  CAPILLARY BLOOD GLUCOSE      POCT Blood Glucose.: 189 mg/dL (24 Apr 2020 11:57)  POCT Blood Glucose.: 84 mg/dL (24 Apr 2020 08:23)  POCT Blood Glucose.: 124 mg/dL (23 Apr 2020 21:43)  POCT Blood Glucose.: 116 mg/dL (23 Apr 2020 17:24)    I&O's Summary    23 Apr 2020 07:01  -  24 Apr 2020 07:00  --------------------------------------------------------  IN: 1100 mL / OUT: 800 mL / NET: 300 mL    24 Apr 2020 07:01  -  24 Apr 2020 12:27  --------------------------------------------------------  IN: 240 mL / OUT: 350 mL / NET: -110 mL          PHYSICAL EXAM  GENERAL: NAD, well-developed  HEAD:  Atraumatic, Normocephalic  EYES: EOMI, PERRLA, conjunctiva and sclera clear  CHEST/LUNG: Clear to auscultation bilaterally; No wheeze  HEART: Regular rate and rhythm; No murmurs, rubs, or gallops  ABDOMEN: Soft, Nontender, Nondistended; Bowel sounds present  EXTREMITIES:  2+ Peripheral Pulses, No clubbing, cyanosis, or edema  PSYCH: AAOx3      LABS:                        11.3   6.92  )-----------( 220      ( 24 Apr 2020 06:58 )             34.1     04-24    136  |  98  |  15  ----------------------------<  103<H>  4.2   |  27  |  0.94    Ca    9.4      24 Apr 2020 06:56  Phos  3.9     04-24  Mg     2.1     04-24                  RADIOLOGY & ADDITIONAL TESTS:    Imaging Personally Reviewed:  Consultant(s) Notes Reviewed:    Care Discussed with Consultants/Other Providers: Harvey Vazquez MD  Pager [phone]  Spectra [phone]  Cell Phone [phone]    Patient is a 64y old  Male who presents with a chief complaint of hypoxia (23 Apr 2020 11:25)        SUBJECTIVE / OVERNIGHT EVENTS: Patient states his appetite was poor this am. Still has dyspnea with exertion.      MEDICATIONS  (STANDING):  carvedilol 25 milliGRAM(s) Oral every 12 hours  dextrose 5%. 1000 milliLiter(s) (50 mL/Hr) IV Continuous <Continuous>  dextrose 50% Injectable 12.5 Gram(s) IV Push once  dextrose 50% Injectable 25 Gram(s) IV Push once  dextrose 50% Injectable 25 Gram(s) IV Push once  enoxaparin Injectable 40 milliGRAM(s) SubCutaneous daily  insulin lispro (HumaLOG) corrective regimen sliding scale   SubCutaneous three times a day before meals  insulin lispro (HumaLOG) corrective regimen sliding scale   SubCutaneous at bedtime  NIFEdipine XL 30 milliGRAM(s) Oral daily  polyethylene glycol 3350 17 Gram(s) Oral daily    MEDICATIONS  (PRN):  acetaminophen   Tablet .. 650 milliGRAM(s) Oral every 4 hours PRN Temp greater or equal to 38.5C (101.3F)  benzocaine 15 mG/menthol 3.6 mG (Sugar-Free) Lozenge 1 Lozenge Oral three times a day PRN Sore Throat  dextrose 40% Gel 15 Gram(s) Oral once PRN Blood Glucose LESS THAN 70 milliGRAM(s)/deciliter  glucagon  Injectable 1 milliGRAM(s) IntraMuscular once PRN Glucose LESS THAN 70 milligrams/deciliter  sodium chloride 0.65% Nasal 1 Spray(s) Both Nostrils four times a day PRN Nasal Congestion      Vital Signs Last 24 Hrs  T(C): 36.7 (24 Apr 2020 08:16), Max: 37.2 (23 Apr 2020 18:04)  T(F): 98 (24 Apr 2020 08:16), Max: 99 (24 Apr 2020 04:53)  HR: 92 (24 Apr 2020 08:16) (83 - 92)  BP: 129/80 (24 Apr 2020 08:16) (129/78 - 131/77)  BP(mean): --  RR: 20 (24 Apr 2020 08:16) (20 - 20)  SpO2: 96% (24 Apr 2020 08:16) (96% - 98%)  CAPILLARY BLOOD GLUCOSE      POCT Blood Glucose.: 189 mg/dL (24 Apr 2020 11:57)  POCT Blood Glucose.: 84 mg/dL (24 Apr 2020 08:23)  POCT Blood Glucose.: 124 mg/dL (23 Apr 2020 21:43)  POCT Blood Glucose.: 116 mg/dL (23 Apr 2020 17:24)    I&O's Summary    23 Apr 2020 07:01  -  24 Apr 2020 07:00  --------------------------------------------------------  IN: 1100 mL / OUT: 800 mL / NET: 300 mL    24 Apr 2020 07:01  -  24 Apr 2020 12:27  --------------------------------------------------------  IN: 240 mL / OUT: 350 mL / NET: -110 mL          PHYSICAL EXAM  GENERAL: NAD, well-developed  HEAD:  Atraumatic, Normocephalic  EYES: EOMI, PERRLA, conjunctiva and sclera clear  CHEST/LUNG: Faint bibasilar rales; No wheezes  HEART: Regular rate and rhythm; No murmurs, rubs, or gallops  ABDOMEN: Soft, Nontender, Nondistended; Bowel sounds present  EXTREMITIES:  2+ Peripheral Pulses, No clubbing, cyanosis, or edema  PSYCH: AAOx3      LABS:                        11.3   6.92  )-----------( 220      ( 24 Apr 2020 06:58 )             34.1     04-24    136  |  98  |  15  ----------------------------<  103<H>  4.2   |  27  |  0.94    Ca    9.4      24 Apr 2020 06:56  Phos  3.9     04-24  Mg     2.1     04-24                  RADIOLOGY & ADDITIONAL TESTS:    Imaging Personally Reviewed:  Consultant(s) Notes Reviewed:    Care Discussed with Consultants/Other Providers:

## 2020-04-24 NOTE — PROGRESS NOTE ADULT - PROBLEM SELECTOR PLAN 3
HgA1c 8.0   Start consistent carbohydrate diet  Diabetic education HgA1c 8.0   Continue consistent carbohydrate diet  Diabetic education

## 2020-04-25 LAB
GLUCOSE BLDC GLUCOMTR-MCNC: 117 MG/DL — HIGH (ref 70–99)
GLUCOSE BLDC GLUCOMTR-MCNC: 128 MG/DL — HIGH (ref 70–99)
GLUCOSE BLDC GLUCOMTR-MCNC: 146 MG/DL — HIGH (ref 70–99)
GLUCOSE BLDC GLUCOMTR-MCNC: 166 MG/DL — HIGH (ref 70–99)

## 2020-04-25 PROCEDURE — 99232 SBSQ HOSP IP/OBS MODERATE 35: CPT

## 2020-04-25 RX ADMIN — ENOXAPARIN SODIUM 40 MILLIGRAM(S): 100 INJECTION SUBCUTANEOUS at 12:20

## 2020-04-25 RX ADMIN — CARVEDILOL PHOSPHATE 25 MILLIGRAM(S): 80 CAPSULE, EXTENDED RELEASE ORAL at 05:49

## 2020-04-25 RX ADMIN — Medication 1: at 17:09

## 2020-04-25 RX ADMIN — Medication 30 MILLIGRAM(S): at 05:49

## 2020-04-25 RX ADMIN — CARVEDILOL PHOSPHATE 25 MILLIGRAM(S): 80 CAPSULE, EXTENDED RELEASE ORAL at 17:10

## 2020-04-25 NOTE — PROGRESS NOTE ADULT - PROBLEM SELECTOR PLAN 4
Lovenox 40 mg sq daily    IMPROVE VTE Score 1  By age>60 and Ddimer> 2x ULN will provide extended thromboprophylaxis. Patient is agreeable - Xarelto sent to [facility] - not covered by insurance, however [facility] will provide 4 weeks for free.     Patient continues to have an SpO2 86-87% when ambulating on RA. Revisited possibility of discharge with home O2- patient is very hesitant. Plan continues to be for D/C home once O2 sats acceptable with ambulation on RA.

## 2020-04-25 NOTE — PROGRESS NOTE ADULT - SUBJECTIVE AND OBJECTIVE BOX
Medicine Progress Note    Patient is a 64y old  Male who presents with a chief complaint of hypoxia (24 Apr 2020 12:27)      SUBJECTIVE / OVERNIGHT EVENTS: Patient seen and examined at bedside - overall feels better, trying to wean himself off the oxygen, however, still with sob on minimal exertion and desaturation to 80s on ambulation    ADDITIONAL REVIEW OF SYSTEMS: as above    MEDICATIONS  (STANDING):  carvedilol 25 milliGRAM(s) Oral every 12 hours  dextrose 5%. 1000 milliLiter(s) (50 mL/Hr) IV Continuous <Continuous>  dextrose 50% Injectable 12.5 Gram(s) IV Push once  dextrose 50% Injectable 25 Gram(s) IV Push once  dextrose 50% Injectable 25 Gram(s) IV Push once  enoxaparin Injectable 40 milliGRAM(s) SubCutaneous daily  insulin lispro (HumaLOG) corrective regimen sliding scale   SubCutaneous three times a day before meals  insulin lispro (HumaLOG) corrective regimen sliding scale   SubCutaneous at bedtime  NIFEdipine XL 30 milliGRAM(s) Oral daily  polyethylene glycol 3350 17 Gram(s) Oral daily    MEDICATIONS  (PRN):  acetaminophen   Tablet .. 650 milliGRAM(s) Oral every 4 hours PRN Temp greater or equal to 38.5C (101.3F)  benzocaine 15 mG/menthol 3.6 mG (Sugar-Free) Lozenge 1 Lozenge Oral three times a day PRN Sore Throat  dextrose 40% Gel 15 Gram(s) Oral once PRN Blood Glucose LESS THAN 70 milliGRAM(s)/deciliter  glucagon  Injectable 1 milliGRAM(s) IntraMuscular once PRN Glucose LESS THAN 70 milligrams/deciliter  sodium chloride 0.65% Nasal 1 Spray(s) Both Nostrils four times a day PRN Nasal Congestion    CAPILLARY BLOOD GLUCOSE      POCT Blood Glucose.: 117 mg/dL (25 Apr 2020 07:58)  POCT Blood Glucose.: 125 mg/dL (24 Apr 2020 21:36)  POCT Blood Glucose.: 150 mg/dL (24 Apr 2020 16:51)  POCT Blood Glucose.: 189 mg/dL (24 Apr 2020 11:57)    I&O's Summary    24 Apr 2020 07:01  -  25 Apr 2020 07:00  --------------------------------------------------------  IN: 600 mL / OUT: 350 mL / NET: 250 mL        PHYSICAL EXAM:  Vital Signs Last 24 Hrs  T(C): 36.8 (25 Apr 2020 10:22), Max: 36.9 (25 Apr 2020 04:33)  T(F): 98.2 (25 Apr 2020 10:22), Max: 98.4 (25 Apr 2020 04:33)  HR: 86 (25 Apr 2020 10:22) (85 - 86)  BP: 114/74 (25 Apr 2020 10:22) (112/74 - 127/81)  BP(mean): --  RR: 20 (25 Apr 2020 10:22) (18 - 20)  SpO2: 96% (25 Apr 2020 10:22) (94% - 96%)  CONSTITUTIONAL: NAD, well-developed, well-groomed  ENMT: Moist oral mucosa, no pharyngeal injection or exudates; normal dentition  RESPIRATORY: Normal respiratory effort  CARDIOVASCULAR: Regular rate and rhythm, No lower extremity edema; Peripheral pulses are 2+ bilaterally  ABDOMEN: Nontender to palpation, normoactive bowel sounds, no rebound/guarding; No hepatosplenomegaly  PSYCH: A+O to person, place, and time; affect appropriate  NEUROLOGY: no gross sensory deficits   SKIN: No rashes; no palpable lesions    LABS:                        11.3   6.92  )-----------( 220      ( 24 Apr 2020 06:58 )             34.1     04-24    136  |  98  |  15  ----------------------------<  103<H>  4.2   |  27  |  0.94    Ca    9.4      24 Apr 2020 06:56  Phos  3.9     04-24  Mg     2.1     04-24                COVID-19 PCR: Detected (02 Apr 2020 13:58)      RADIOLOGY & ADDITIONAL TESTS:  Imaging from Last 24 Hours:    Electrocardiogram/QTc Interval:    COORDINATION OF CARE:  Care Discussed with Consultants/Other Providers:

## 2020-04-25 NOTE — PROGRESS NOTE ADULT - PROBLEM SELECTOR PLAN 1
Hypoxic resp failure due to COVID: symptoms 10 days prior to admission with fevers, myalgias, multiple weeks since symptom onset  Plaquenil completed 4/2-4/7  CTA neg for PE  Repeated ambulatory O2 sat on RA: high 80s% today

## 2020-04-26 LAB
GLUCOSE BLDC GLUCOMTR-MCNC: 125 MG/DL — HIGH (ref 70–99)
GLUCOSE BLDC GLUCOMTR-MCNC: 126 MG/DL — HIGH (ref 70–99)
GLUCOSE BLDC GLUCOMTR-MCNC: 166 MG/DL — HIGH (ref 70–99)
GLUCOSE BLDC GLUCOMTR-MCNC: 187 MG/DL — HIGH (ref 70–99)

## 2020-04-26 PROCEDURE — 99232 SBSQ HOSP IP/OBS MODERATE 35: CPT

## 2020-04-26 RX ADMIN — ENOXAPARIN SODIUM 40 MILLIGRAM(S): 100 INJECTION SUBCUTANEOUS at 12:45

## 2020-04-26 RX ADMIN — CARVEDILOL PHOSPHATE 25 MILLIGRAM(S): 80 CAPSULE, EXTENDED RELEASE ORAL at 17:39

## 2020-04-26 RX ADMIN — Medication 30 MILLIGRAM(S): at 06:25

## 2020-04-26 RX ADMIN — Medication 1: at 17:39

## 2020-04-26 RX ADMIN — CARVEDILOL PHOSPHATE 25 MILLIGRAM(S): 80 CAPSULE, EXTENDED RELEASE ORAL at 06:25

## 2020-04-26 RX ADMIN — Medication 1: at 12:46

## 2020-04-26 NOTE — PROGRESS NOTE ADULT - SUBJECTIVE AND OBJECTIVE BOX
Medicine Progress Note    Patient is a 64y old  Male who presents with a chief complaint of hypoxia (25 Apr 2020 11:36)      SUBJECTIVE / OVERNIGHT EVENTS: Patient seen and examined at bedside - overall feels better, has been able to spend more time off oxygen. Ambulated yesterday with saturation 92-93. Last night with sob/[place]an[place] - back on oxygen.     ADDITIONAL REVIEW OF SYSTEMS: as above    MEDICATIONS  (STANDING):  carvedilol 25 milliGRAM(s) Oral every 12 hours  dextrose 5%. 1000 milliLiter(s) (50 mL/Hr) IV Continuous <Continuous>  dextrose 50% Injectable 12.5 Gram(s) IV Push once  dextrose 50% Injectable 25 Gram(s) IV Push once  dextrose 50% Injectable 25 Gram(s) IV Push once  enoxaparin Injectable 40 milliGRAM(s) SubCutaneous daily  insulin lispro (HumaLOG) corrective regimen sliding scale   SubCutaneous three times a day before meals  insulin lispro (HumaLOG) corrective regimen sliding scale   SubCutaneous at bedtime  NIFEdipine XL 30 milliGRAM(s) Oral daily  polyethylene glycol 3350 17 Gram(s) Oral daily    MEDICATIONS  (PRN):  acetaminophen   Tablet .. 650 milliGRAM(s) Oral every 4 hours PRN Temp greater or equal to 38.5C (101.3F)  benzocaine 15 mG/menthol 3.6 mG (Sugar-Free) Lozenge 1 Lozenge Oral three times a day PRN Sore Throat  dextrose 40% Gel 15 Gram(s) Oral once PRN Blood Glucose LESS THAN 70 milliGRAM(s)/deciliter  glucagon  Injectable 1 milliGRAM(s) IntraMuscular once PRN Glucose LESS THAN 70 milligrams/deciliter  sodium chloride 0.65% Nasal 1 Spray(s) Both Nostrils four times a day PRN Nasal Congestion    CAPILLARY BLOOD GLUCOSE      POCT Blood Glucose.: 187 mg/dL (26 Apr 2020 12:07)  POCT Blood Glucose.: 125 mg/dL (26 Apr 2020 08:35)  POCT Blood Glucose.: 128 mg/dL (25 Apr 2020 22:14)  POCT Blood Glucose.: 166 mg/dL (25 Apr 2020 15:52)    I&O's Summary    25 Apr 2020 07:01  -  26 Apr 2020 07:00  --------------------------------------------------------  IN: 480 mL / OUT: 0 mL / NET: 480 mL    26 Apr 2020 07:01  -  26 Apr 2020 12:20  --------------------------------------------------------  IN: 480 mL / OUT: 300 mL / NET: 180 mL        PHYSICAL EXAM:  Vital Signs Last 24 Hrs  T(C): 36.9 (26 Apr 2020 09:57), Max: 37.2 (25 Apr 2020 19:25)  T(F): 98.5 (26 Apr 2020 09:57), Max: 98.9 (25 Apr 2020 19:25)  HR: 89 (26 Apr 2020 09:57) (83 - 90)  BP: 124/77 (26 Apr 2020 09:57) (124/77 - 131/79)  BP(mean): --  RR: 20 (26 Apr 2020 09:57) (20 - 20)  SpO2: 90% (26 Apr 2020 10:03) (90% - 93%)    CONSTITUTIONAL: NAD, well-developed, well-groomed  ENMT: Moist oral mucosa, no pharyngeal injection or exudates; normal dentition  RESPIRATORY: Normal respiratory effort  CARDIOVASCULAR: Regular rate and rhythm, No lower extremity edema; Peripheral pulses are 2+ bilaterally  ABDOMEN: Nontender to palpation  PSYCH: A+O to person, place, and time; affect appropriate  NEUROLOGY: no gross sensory deficits   SKIN: No rashes; no palpable lesions    LABS:                    COVID-19 PCR: Detected (02 Apr 2020 13:58)      RADIOLOGY & ADDITIONAL TESTS:  Imaging from Last 24 Hours:    Electrocardiogram/QTc Interval:    COORDINATION OF CARE:  Care Discussed with Consultants/Other Providers:

## 2020-04-26 NOTE — PROGRESS NOTE ADULT - PROBLEM SELECTOR PLAN 4
Lovenox 40 mg sq daily    IMPROVE VTE Score 1  By age>60 and Ddimer> 2x ULN will provide extended thromboprophylaxis. Patient is agreeable - Xarelto sent to [facility] - not covered by insurance, however [facility] will provide 4 weeks for free.     Patient continues to have an SpO2 86-87% when ambulating on RA. Revisited possibility of discharge with home O2- patient is very hesitant. Plan continues to be for D/C home once O2 sats acceptable with ambulation on RA.

## 2020-04-26 NOTE — PROGRESS NOTE ADULT - ATTENDING COMMENTS
d/w patient at length - given his stability and intermittent need of oxygen, he is medically cleared for discharge once oxygen set up at home. He is agreeable to home oxygen and oximeter at this time. Will need to arrange tomorrow and plan for d/c home. Patient is aware and agreeable, however, says that he might have a problem obtaining a ride tomorrow. Will need to d/w CM.

## 2020-04-27 ENCOUNTER — TRANSCRIPTION ENCOUNTER (OUTPATIENT)
Age: 65
End: 2020-04-27

## 2020-04-27 VITALS
HEART RATE: 89 BPM | RESPIRATION RATE: 18 BRPM | TEMPERATURE: 99 F | OXYGEN SATURATION: 94 % | DIASTOLIC BLOOD PRESSURE: 69 MMHG | SYSTOLIC BLOOD PRESSURE: 105 MMHG

## 2020-04-27 LAB
GLUCOSE BLDC GLUCOMTR-MCNC: 119 MG/DL — HIGH (ref 70–99)
GLUCOSE BLDC GLUCOMTR-MCNC: 150 MG/DL — HIGH (ref 70–99)

## 2020-04-27 PROCEDURE — 84145 PROCALCITONIN (PCT): CPT

## 2020-04-27 PROCEDURE — 83735 ASSAY OF MAGNESIUM: CPT

## 2020-04-27 PROCEDURE — 84484 ASSAY OF TROPONIN QUANT: CPT

## 2020-04-27 PROCEDURE — 86803 HEPATITIS C AB TEST: CPT

## 2020-04-27 PROCEDURE — 93005 ELECTROCARDIOGRAM TRACING: CPT

## 2020-04-27 PROCEDURE — 85379 FIBRIN DEGRADATION QUANT: CPT

## 2020-04-27 PROCEDURE — 82550 ASSAY OF CK (CPK): CPT

## 2020-04-27 PROCEDURE — 71045 X-RAY EXAM CHEST 1 VIEW: CPT

## 2020-04-27 PROCEDURE — 84100 ASSAY OF PHOSPHORUS: CPT

## 2020-04-27 PROCEDURE — 85027 COMPLETE CBC AUTOMATED: CPT

## 2020-04-27 PROCEDURE — 82962 GLUCOSE BLOOD TEST: CPT

## 2020-04-27 PROCEDURE — 83605 ASSAY OF LACTIC ACID: CPT

## 2020-04-27 PROCEDURE — 82728 ASSAY OF FERRITIN: CPT

## 2020-04-27 PROCEDURE — 80053 COMPREHEN METABOLIC PANEL: CPT

## 2020-04-27 PROCEDURE — 86140 C-REACTIVE PROTEIN: CPT

## 2020-04-27 PROCEDURE — 87635 SARS-COV-2 COVID-19 AMP PRB: CPT

## 2020-04-27 PROCEDURE — 83615 LACTATE (LD) (LDH) ENZYME: CPT

## 2020-04-27 PROCEDURE — 85730 THROMBOPLASTIN TIME PARTIAL: CPT

## 2020-04-27 PROCEDURE — 71275 CT ANGIOGRAPHY CHEST: CPT

## 2020-04-27 PROCEDURE — 80048 BASIC METABOLIC PNL TOTAL CA: CPT

## 2020-04-27 PROCEDURE — 99285 EMERGENCY DEPT VISIT HI MDM: CPT

## 2020-04-27 PROCEDURE — 94640 AIRWAY INHALATION TREATMENT: CPT

## 2020-04-27 PROCEDURE — 85610 PROTHROMBIN TIME: CPT

## 2020-04-27 PROCEDURE — 85652 RBC SED RATE AUTOMATED: CPT

## 2020-04-27 PROCEDURE — 85384 FIBRINOGEN ACTIVITY: CPT

## 2020-04-27 PROCEDURE — 83036 HEMOGLOBIN GLYCOSYLATED A1C: CPT

## 2020-04-27 PROCEDURE — 99239 HOSP IP/OBS DSCHRG MGMT >30: CPT

## 2020-04-27 RX ORDER — NIFEDIPINE 30 MG
1 TABLET, EXTENDED RELEASE 24 HR ORAL
Qty: 30 | Refills: 0
Start: 2020-04-27 | End: 2020-05-26

## 2020-04-27 RX ORDER — NEBIVOLOL HYDROCHLORIDE 5 MG/1
1 TABLET ORAL
Qty: 28 | Refills: 0
Start: 2020-04-27

## 2020-04-27 RX ORDER — NEBIVOLOL HYDROCHLORIDE 5 MG/1
1 TABLET ORAL
Qty: 0 | Refills: 0 | DISCHARGE

## 2020-04-27 RX ORDER — RIVAROXABAN 15 MG-20MG
1 KIT ORAL
Qty: 28 | Refills: 0
Start: 2020-04-27 | End: 2020-05-24

## 2020-04-27 RX ADMIN — CARVEDILOL PHOSPHATE 25 MILLIGRAM(S): 80 CAPSULE, EXTENDED RELEASE ORAL at 06:02

## 2020-04-27 RX ADMIN — ENOXAPARIN SODIUM 40 MILLIGRAM(S): 100 INJECTION SUBCUTANEOUS at 12:18

## 2020-04-27 RX ADMIN — Medication 30 MILLIGRAM(S): at 06:02

## 2020-04-27 NOTE — PROGRESS NOTE ADULT - REASON FOR ADMISSION
hypoxia

## 2020-04-27 NOTE — DISCHARGE NOTE NURSING/CASE MANAGEMENT/SOCIAL WORK - PATIENT PORTAL LINK FT
You can access the FollowMyHealth Patient Portal offered by [facility] by registering at the following website: [email or web address]. By joining [employer]’s FollowMyHealth portal, you will also be able to view your health information using other applications (apps) compatible with our system.

## 2020-04-27 NOTE — PROGRESS NOTE ADULT - PROBLEM SELECTOR PLAN 1
Hypoxic resp failure due to COVID: symptoms 10 days prior to admission with fevers, myalgias, multiple weeks since symptom onset  Plaquenil completed 4/2-4/7  CTA neg for PE  Repeated ambulatory O2 sat on RA: 90's today

## 2020-04-27 NOTE — DISCHARGE NOTE NURSING/CASE MANAGEMENT/SOCIAL WORK - NSDCFUADDAPPT_GEN_ALL_CORE_FT
f/u with your PCP in 1-2 weeks   if you cannot get f/u appointment with your PCP you may call   [phone] for post discharge f/u

## 2020-04-27 NOTE — PROGRESS NOTE ADULT - PROBLEM SELECTOR PLAN 4
Lovenox 40 mg sq daily    IMPROVE VTE Score 1  By age>60 and Ddimer> 2x ULN will provide extended thromboprophylaxis. Patient is agreeable - Xarelto sent to [facility] - not covered by insurance, however [facility] will provide 4 weeks for free.

## 2020-04-27 NOTE — PROGRESS NOTE ADULT - ASSESSMENT
63yo M pmh HTN admitted 4/2 with acute hypoxic respiratory failure due to COVID completed plaquenil now maintaining oxygen saturation with ambulation on RA. Discharge home today on extended pharmacologic DVT prophylaxis as below. 40 minutes spent discharging the patient.

## 2020-04-27 NOTE — PROGRESS NOTE ADULT - SUBJECTIVE AND OBJECTIVE BOX
Harvey Vazquez MD  Pager [phone]  Spectra [phone]  Cell Phone [phone]    Patient is a 64y old  Male who presents with a chief complaint of hypoxia (26 Apr 2020 12:20)        SUBJECTIVE / OVERNIGHT EVENTS: Patient ambulated on RA this morning- SpO2 90-93%. Nervous about being discharged.      MEDICATIONS  (STANDING):  carvedilol 25 milliGRAM(s) Oral every 12 hours  dextrose 5%. 1000 milliLiter(s) (50 mL/Hr) IV Continuous <Continuous>  dextrose 50% Injectable 12.5 Gram(s) IV Push once  dextrose 50% Injectable 25 Gram(s) IV Push once  dextrose 50% Injectable 25 Gram(s) IV Push once  enoxaparin Injectable 40 milliGRAM(s) SubCutaneous daily  insulin lispro (HumaLOG) corrective regimen sliding scale   SubCutaneous three times a day before meals  insulin lispro (HumaLOG) corrective regimen sliding scale   SubCutaneous at bedtime  NIFEdipine XL 30 milliGRAM(s) Oral daily  polyethylene glycol 3350 17 Gram(s) Oral daily    MEDICATIONS  (PRN):  acetaminophen   Tablet .. 650 milliGRAM(s) Oral every 4 hours PRN Temp greater or equal to 38.5C (101.3F)  benzocaine 15 mG/menthol 3.6 mG (Sugar-Free) Lozenge 1 Lozenge Oral three times a day PRN Sore Throat  dextrose 40% Gel 15 Gram(s) Oral once PRN Blood Glucose LESS THAN 70 milliGRAM(s)/deciliter  glucagon  Injectable 1 milliGRAM(s) IntraMuscular once PRN Glucose LESS THAN 70 milligrams/deciliter  sodium chloride 0.65% Nasal 1 Spray(s) Both Nostrils four times a day PRN Nasal Congestion      Vital Signs Last 24 Hrs  T(C): 37.1 (27 Apr 2020 05:53), Max: 37.1 (27 Apr 2020 05:53)  T(F): 98.7 (27 Apr 2020 05:53), Max: 98.7 (27 Apr 2020 05:53)  HR: 80 (27 Apr 2020 05:53) (80 - 90)  BP: 136/80 (27 Apr 2020 05:53) (124/77 - 136/80)  BP(mean): --  RR: 18 (27 Apr 2020 05:53) (18 - 20)  SpO2: 94% (27 Apr 2020 05:53) (90% - 97%)  CAPILLARY BLOOD GLUCOSE      POCT Blood Glucose.: 119 mg/dL (27 Apr 2020 07:53)  POCT Blood Glucose.: 126 mg/dL (26 Apr 2020 22:23)  POCT Blood Glucose.: 166 mg/dL (26 Apr 2020 17:16)  POCT Blood Glucose.: 187 mg/dL (26 Apr 2020 12:07)    I&O's Summary    26 Apr 2020 07:01  -  27 Apr 2020 07:00  --------------------------------------------------------  IN: 480 mL / OUT: 300 mL / NET: 180 mL          PHYSICAL EXAM  GENERAL: NAD, well-developed  HEAD:  Atraumatic, Normocephalic  EYES: EOMI, PERRLA, conjunctiva and sclera clear  CHEST/LUNG: Clear to auscultation bilaterally; No wheeze  HEART: Regular rate and rhythm; No murmurs, rubs, or gallops  ABDOMEN: Soft, Nontender, Nondistended; Bowel sounds present  EXTREMITIES:  2+ Peripheral Pulses, No clubbing, cyanosis, or edema  PSYCH: AAOx3      LABS:                      RADIOLOGY & ADDITIONAL TESTS:    Imaging Personally Reviewed:  Consultant(s) Notes Reviewed:    Care Discussed with Consultants/Other Providers:

## 2020-04-27 NOTE — DISCHARGE NOTE NURSING/CASE MANAGEMENT/SOCIAL WORK - NSDCPEXARELTO_GEN_ALL_CORE
Final Anesthesia Post-op Assessment    Patient: Yi Mari  Procedure(s) Performed: COLONOSCOPY  Anesthesia type: Monitor Anesthesia Care    Vitals Value Taken Time   Temp 37.1 °C (98.7 °F) 4/10/2019  9:24 AM   Pulse 68 4/10/2019  9:53 AM   Resp 14 4/10/2019  9:53 AM   /88 4/10/2019  9:46 AM   SpO2 100 % 4/10/2019  9:53 AM   Vitals shown include unvalidated device data.    Last 24 I/O:     Intake/Output Summary (Last 24 hours) at 4/10/2019 0954  Last data filed at 4/10/2019 0936  Gross per 24 hour   Intake 310 ml   Output --   Net 310 ml       PATIENT LOCATION: Phase II  POST-OP VITAL SIGNS: stable  LEVEL OF CONSCIOUSNESS: participates in exam, answers questions appropriately, awake, alert and oriented  RESPIRATORY STATUS: spontaneous ventilation  CARDIOVASCULAR: blood pressure returned to baseline and stable  HYDRATION: euvolemic    PAIN MANAGEMENT: adequately controlled  NAUSEA: None  AIRWAY PATENCY: patent  POST-OP ASSESSMENT: no complications, patient tolerated procedure well with no complications and sufficiently recovered from acute administration of anesthesia effects and able to participate in evaluation  COMPLICATIONS: none       Rivaroxaban/Xarelto - Follow up monitoring/Rivaroxaban/Xarelto - Compliance/Rivaroxaban/Xarelto - Dietary Advice/Rivaroxaban/Xarelto - Potential for adverse drug reactions and interactions

## 2020-05-06 PROBLEM — I10 ESSENTIAL (PRIMARY) HYPERTENSION: Chronic | Status: ACTIVE | Noted: 2020-04-02

## 2020-05-06 PROBLEM — R73.03 PREDIABETES: Chronic | Status: ACTIVE | Noted: 2020-04-02

## 2020-05-18 ENCOUNTER — APPOINTMENT (OUTPATIENT)
Dept: INTERNAL MEDICINE | Facility: CLINIC | Age: 65
End: 2020-05-18

## 2020-05-18 ENCOUNTER — TRANSCRIPTION ENCOUNTER (OUTPATIENT)
Age: 65
End: 2020-05-18

## 2020-05-22 ENCOUNTER — APPOINTMENT (OUTPATIENT)
Dept: INTERNAL MEDICINE | Facility: CLINIC | Age: 65
End: 2020-05-22
Payer: MEDICAID

## 2020-05-22 VITALS
OXYGEN SATURATION: 96 % | WEIGHT: 150 LBS | RESPIRATION RATE: 14 BRPM | HEART RATE: 83 BPM | DIASTOLIC BLOOD PRESSURE: 91 MMHG | TEMPERATURE: 98.1 F | BODY MASS INDEX: 24.69 KG/M2 | SYSTOLIC BLOOD PRESSURE: 142 MMHG | HEIGHT: 65.5 IN

## 2020-05-22 DIAGNOSIS — U07.1 COVID-19: ICD-10-CM

## 2020-05-22 PROCEDURE — 99204 OFFICE O/P NEW MOD 45 MIN: CPT

## 2020-05-22 PROCEDURE — 99214 OFFICE O/P EST MOD 30 MIN: CPT

## 2020-05-22 NOTE — PHYSICAL EXAM
[Decreased Breath Sounds] : breath sounds were decreased diffusely [Normal] : affect was normal and insight and judgment were intact

## 2020-05-22 NOTE — HISTORY OF PRESENT ILLNESS
[FreeTextEntry1] : SOB\par WEAK [de-identified] : PT WAS HOSPITALIZED 4/2 TO 4/25 AFTER BEEN DX WITH COVID-19 DISEASE;AFEBRILE SINCE BEFORE D/C,REGAINING WT LOSS AND ABLE TO WALK A FEW BLOCKS\par BECAME SICK MID MARCH,LIVES WITH WIFE AND SON ,NEITHER DEVELOPED ANY SX\par D/C ON XARELTO FOR 1 MAS WELL AS BYSTOLIC AND PROCARDIA FOR HTN;WAS ONLY ON NC O2 AND HOME O2 SAT IS ABOVE 95 %

## 2020-05-22 NOTE — REVIEW OF SYSTEMS
[Cough] : cough [Recent Change In Weight] : ~T recent weight change [Dyspnea on Exertion] : dyspnea on exertion [Negative] : Heme/Lymph

## 2020-05-22 NOTE — ASSESSMENT
[FreeTextEntry1] : INITIAL VISIT OF 65 Y OLD WHO CONTRACTED COVID-19 MID MARCH 2020;HOSPITALIZED 4/2 TO 4/27 AND WITHOUT SX EXCEPT [place]= [facility] COVID-19,SENT FOR PCR AS WELL AS PER REQUEST\par HX OF DM AND DYSLIPIDEMIA ON NO MEDS= LABS TODAY AND RTO IN 2 WEEKS\par HTN = CONTINUE BYSTOLIC AND PROCARDIA

## 2020-05-23 LAB
ALBUMIN SERPL ELPH-MCNC: 4.8 G/DL
ALP BLD-CCNC: 68 U/L
ALT SERPL-CCNC: 31 U/L
ANION GAP SERPL CALC-SCNC: 14 MMOL/L
AST SERPL-CCNC: 26 U/L
BASOPHILS # BLD AUTO: 0.05 K/UL
BASOPHILS NFR BLD AUTO: 0.6 %
BILIRUB SERPL-MCNC: 0.6 MG/DL
BUN SERPL-MCNC: 14 MG/DL
CALCIUM SERPL-MCNC: 10.5 MG/DL
CHLORIDE SERPL-SCNC: 98 MMOL/L
CO2 SERPL-SCNC: 27 MMOL/L
CREAT SERPL-MCNC: 0.92 MG/DL
EOSINOPHIL # BLD AUTO: 0.1 K/UL
EOSINOPHIL NFR BLD AUTO: 1.2 %
ESTIMATED AVERAGE GLUCOSE: 157 MG/DL
GLUCOSE SERPL-MCNC: 150 MG/DL
HBA1C MFR BLD HPLC: 7.1 %
HCT VFR BLD CALC: 45.4 %
HGB BLD-MCNC: 14.6 G/DL
IMM GRANULOCYTES NFR BLD AUTO: 0.7 %
LYMPHOCYTES # BLD AUTO: 1.84 K/UL
LYMPHOCYTES NFR BLD AUTO: 22.9 %
MAN DIFF?: NORMAL
MCHC RBC-ENTMCNC: 30.9 PG
MCHC RBC-ENTMCNC: 32.2 GM/DL
MCV RBC AUTO: 96.2 FL
MONOCYTES # BLD AUTO: 0.82 K/UL
MONOCYTES NFR BLD AUTO: 10.2 %
NEUTROPHILS # BLD AUTO: 5.16 K/UL
NEUTROPHILS NFR BLD AUTO: 64.4 %
PLATELET # BLD AUTO: 287 K/UL
POTASSIUM SERPL-SCNC: 4.4 MMOL/L
PROT SERPL-MCNC: 8.2 G/DL
RBC # BLD: 4.72 M/UL
RBC # FLD: 14.3 %
SARS-COV-2 IGG SERPL IA-ACNC: 7.42 INDEX
SARS-COV-2 IGG SERPL QL IA: POSITIVE
SODIUM SERPL-SCNC: 138 MMOL/L
WBC # FLD AUTO: 8.03 K/UL

## 2020-05-26 LAB
CHOLEST SERPL-MCNC: 188 MG/DL
CHOLEST/HDLC SERPL: 5.6 RATIO
HDLC SERPL-MCNC: 34 MG/DL
LDLC SERPL CALC-MCNC: 113 MG/DL
TRIGL SERPL-MCNC: 206 MG/DL

## 2020-06-01 RX ORDER — BLOOD-GLUCOSE METER
KIT MISCELLANEOUS
Qty: 1 | Refills: 0 | Status: ACTIVE | COMMUNITY
Start: 2020-06-01 | End: 1900-01-01

## 2020-06-08 ENCOUNTER — TRANSCRIPTION ENCOUNTER (OUTPATIENT)
Age: 65
End: 2020-06-08

## 2020-06-26 ENCOUNTER — RX CHANGE (OUTPATIENT)
Age: 65
End: 2020-06-26

## 2020-06-29 RX ORDER — LANCETS 32 GAUGE
EACH MISCELLANEOUS
Qty: 1 | Refills: 0 | Status: ACTIVE | COMMUNITY
Start: 2020-06-29 | End: 1900-01-01

## 2020-07-19 ENCOUNTER — RX CHANGE (OUTPATIENT)
Age: 65
End: 2020-07-19

## 2020-08-03 ENCOUNTER — APPOINTMENT (OUTPATIENT)
Dept: INTERNAL MEDICINE | Facility: CLINIC | Age: 65
End: 2020-08-03
Payer: MEDICAID

## 2020-08-03 ENCOUNTER — NON-APPOINTMENT (OUTPATIENT)
Age: 65
End: 2020-08-03

## 2020-08-03 ENCOUNTER — RX CHANGE (OUTPATIENT)
Age: 65
End: 2020-08-03

## 2020-08-03 VITALS
OXYGEN SATURATION: 97 % | HEART RATE: 94 BPM | TEMPERATURE: 97.8 F | DIASTOLIC BLOOD PRESSURE: 95 MMHG | RESPIRATION RATE: 14 BRPM | SYSTOLIC BLOOD PRESSURE: 152 MMHG | WEIGHT: 158 LBS | BODY MASS INDEX: 26.01 KG/M2 | HEIGHT: 65.5 IN

## 2020-08-03 DIAGNOSIS — Z00.00 ENCOUNTER FOR GENERAL ADULT MEDICAL EXAMINATION W/OUT ABNORMAL FINDINGS: ICD-10-CM

## 2020-08-03 PROCEDURE — 99396 PREV VISIT EST AGE 40-64: CPT

## 2020-08-03 PROCEDURE — 99213 OFFICE O/P EST LOW 20 MIN: CPT | Mod: 25

## 2020-08-03 PROCEDURE — 93000 ELECTROCARDIOGRAM COMPLETE: CPT

## 2020-08-03 RX ORDER — PANTOPRAZOLE 40 MG/1
40 TABLET, DELAYED RELEASE ORAL DAILY
Qty: 90 | Refills: 0 | Status: DISCONTINUED | COMMUNITY
Start: 2020-06-03 | End: 2020-08-03

## 2020-08-03 RX ORDER — ESOMEPRAZOLE MAGNESIUM 40 MG/1
40 CAPSULE, DELAYED RELEASE ORAL
Qty: 30 | Refills: 1 | Status: ACTIVE | COMMUNITY
Start: 2020-08-03 | End: 1900-01-01

## 2020-08-03 NOTE — HISTORY OF PRESENT ILLNESS
[de-identified] : CC OF MORNING SORE THROAT ,HX OF GERD OFF PPI FOR MONTHS\par CC OF EITHER FOOT WEAKNESS ON /OFF FOR MANY MONTHS ,NOR BETTER NOR WORSE\par HAVING NECK PAIN FOR LONG TIME ,HAD XR ORDERED BY DR VYAS,WHO PASSED AWAY AND NEVER GOT RESULTS

## 2020-08-03 NOTE — ASSESSMENT
[FreeTextEntry1] : CPE OF 65 Y OLD MALE WITH PMX OF DM ,HTN  AND GERD= LABS AND EKG TODAY \par START NEXIUM 40 MG QAM FOR 1 M \par RTO IN 1 M\par GAIT DISTURBANCE= DISCUSSED AND RECOMM DAILY EXERCISE BEFORE REF TO SEE NEURO\par XR C SPINE ORDERED

## 2020-08-03 NOTE — PHYSICAL EXAM
[No Acute Distress] : no acute distress [Well Nourished] : well nourished [Well Developed] : well developed [Well-Appearing] : well-appearing [Normal Sclera/Conjunctiva] : normal sclera/conjunctiva [PERRL] : pupils equal round and reactive to light [EOMI] : extraocular movements intact [No JVD] : no jugular venous distention [No Lymphadenopathy] : no lymphadenopathy [Supple] : supple [Thyroid Normal, No Nodules] : the thyroid was normal and there were no nodules present [No Respiratory Distress] : no respiratory distress  [Clear to Auscultation] : lungs were clear to auscultation bilaterally [No Accessory Muscle Use] : no accessory muscle use [Normal Rate] : normal rate  [Regular Rhythm] : with a regular rhythm [No Murmur] : no murmur heard [Normal S1, S2] : normal S1 and S2 [No Carotid Bruits] : no carotid bruits [No Abdominal Bruit] : a ~M bruit was not heard ~T in the abdomen [No Varicosities] : no varicosities [Pedal Pulses Present] : the pedal pulses are present [No Edema] : there was no peripheral edema [No Palpable Aorta] : no palpable aorta [No Extremity Clubbing/Cyanosis] : no extremity clubbing/cyanosis [Soft] : abdomen soft [Non Tender] : non-tender [Non-distended] : non-distended [No Masses] : no abdominal mass palpated [Normal Bowel Sounds] : normal bowel sounds [No HSM] : no HSM [Normal Posterior Cervical Nodes] : no posterior cervical lymphadenopathy [Normal Anterior Cervical Nodes] : no anterior cervical lymphadenopathy [No CVA Tenderness] : no CVA  tenderness [No Spinal Tenderness] : no spinal tenderness [No Joint Swelling] : no joint swelling [Grossly Normal Strength/Tone] : grossly normal strength/tone [No Rash] : no rash [Coordination Grossly Intact] : coordination grossly intact [Normal Affect] : the affect was normal [Normal Gait] : normal gait [No Focal Deficits] : no focal deficits [Normal Insight/Judgement] : insight and judgment were intact

## 2020-08-04 LAB
25(OH)D3 SERPL-MCNC: 32.9 NG/ML
ALBUMIN SERPL ELPH-MCNC: 5 G/DL
ALP BLD-CCNC: 74 U/L
ALT SERPL-CCNC: 19 U/L
ANION GAP SERPL CALC-SCNC: 19 MMOL/L
APPEARANCE: CLEAR
AST SERPL-CCNC: 20 U/L
BASOPHILS # BLD AUTO: 0.05 K/UL
BASOPHILS NFR BLD AUTO: 0.4 %
BILIRUB SERPL-MCNC: 0.4 MG/DL
BILIRUBIN URINE: NEGATIVE
BLOOD URINE: NEGATIVE
BUN SERPL-MCNC: 23 MG/DL
CALCIUM SERPL-MCNC: 10.2 MG/DL
CHLORIDE SERPL-SCNC: 100 MMOL/L
CHOLEST SERPL-MCNC: 174 MG/DL
CHOLEST/HDLC SERPL: 5.4 RATIO
CO2 SERPL-SCNC: 23 MMOL/L
COLOR: YELLOW
CREAT SERPL-MCNC: 1.11 MG/DL
EOSINOPHIL # BLD AUTO: 0.09 K/UL
EOSINOPHIL NFR BLD AUTO: 0.6 %
ESTIMATED AVERAGE GLUCOSE: 160 MG/DL
GLUCOSE QUALITATIVE U: NEGATIVE
GLUCOSE SERPL-MCNC: 103 MG/DL
HBA1C MFR BLD HPLC: 7.2 %
HCT VFR BLD CALC: 47.7 %
HDLC SERPL-MCNC: 32 MG/DL
HGB BLD-MCNC: 15.7 G/DL
IMM GRANULOCYTES NFR BLD AUTO: 0.5 %
KETONES URINE: NEGATIVE
LDLC SERPL CALC-MCNC: 102 MG/DL
LEUKOCYTE ESTERASE URINE: NEGATIVE
LYMPHOCYTES # BLD AUTO: 2.1 K/UL
LYMPHOCYTES NFR BLD AUTO: 15.1 %
MAN DIFF?: NORMAL
MCHC RBC-ENTMCNC: 30.8 PG
MCHC RBC-ENTMCNC: 32.9 GM/DL
MCV RBC AUTO: 93.5 FL
MONOCYTES # BLD AUTO: 1.19 K/UL
MONOCYTES NFR BLD AUTO: 8.6 %
NEUTROPHILS # BLD AUTO: 10.37 K/UL
NEUTROPHILS NFR BLD AUTO: 74.8 %
NITRITE URINE: NEGATIVE
PH URINE: 5.5
PLATELET # BLD AUTO: 237 K/UL
POTASSIUM SERPL-SCNC: 5.2 MMOL/L
PROT SERPL-MCNC: 8.1 G/DL
PROTEIN URINE: NEGATIVE
PSA FREE FLD-MCNC: 29 %
PSA FREE SERPL-MCNC: 0.34 NG/ML
PSA SERPL-MCNC: 1.16 NG/ML
RBC # BLD: 5.1 M/UL
RBC # FLD: 12.2 %
SODIUM SERPL-SCNC: 142 MMOL/L
SPECIFIC GRAVITY URINE: 1.03
TRIGL SERPL-MCNC: 198 MG/DL
TSH SERPL-ACNC: 0.87 UIU/ML
UROBILINOGEN URINE: NORMAL
WBC # FLD AUTO: 13.87 K/UL

## 2020-08-26 ENCOUNTER — APPOINTMENT (OUTPATIENT)
Dept: INTERNAL MEDICINE | Facility: CLINIC | Age: 65
End: 2020-08-26
Payer: MEDICAID

## 2020-08-26 VITALS
SYSTOLIC BLOOD PRESSURE: 124 MMHG | BODY MASS INDEX: 25.68 KG/M2 | RESPIRATION RATE: 16 BRPM | WEIGHT: 156 LBS | DIASTOLIC BLOOD PRESSURE: 77 MMHG | TEMPERATURE: 98.4 F | HEART RATE: 88 BPM | HEIGHT: 65.5 IN | OXYGEN SATURATION: 97 %

## 2020-08-26 PROCEDURE — 99214 OFFICE O/P EST MOD 30 MIN: CPT

## 2020-08-26 RX ORDER — LANCETS
EACH MISCELLANEOUS
Qty: 100 | Refills: 3 | Status: ACTIVE | COMMUNITY
Start: 2020-08-26 | End: 1900-01-01

## 2020-08-26 RX ORDER — BLOOD SUGAR DIAGNOSTIC
STRIP MISCELLANEOUS TWICE DAILY
Qty: 1 | Refills: 0 | Status: DISCONTINUED | COMMUNITY
Start: 2020-06-29 | End: 2020-08-26

## 2020-08-26 RX ORDER — BLOOD SUGAR DIAGNOSTIC
STRIP MISCELLANEOUS
Qty: 100 | Refills: 0 | Status: ACTIVE | COMMUNITY
Start: 2020-08-26 | End: 1900-01-01

## 2020-08-26 RX ORDER — VALACYCLOVIR 1 G/1
1 TABLET, FILM COATED ORAL
Qty: 4 | Refills: 4 | Status: ACTIVE | COMMUNITY
Start: 2020-08-26 | End: 1900-01-01

## 2020-08-26 RX ORDER — BLOOD-GLUCOSE METER
W/DEVICE EACH MISCELLANEOUS
Qty: 1 | Refills: 0 | Status: ACTIVE | COMMUNITY
Start: 2020-08-26 | End: 1900-01-01

## 2020-08-26 NOTE — HISTORY OF PRESENT ILLNESS
[de-identified] : PT COMES FOR F/U LABS AND C SPINE XR THAT SHOWED RETROLISTHESIS AND DJD ALONG ALL OF IT \par ALSO CONCERN ABOUT DIFFUSE ARTHRALGIAS OF LIMBS FOR MONTHS \par CC OF RECENT RECURRENT HSV2 ,HAD RUN OUT OF VALACYCLOVIR\par CC OF INTERMIT ENT BPH SX,WANTS TO SEE  IN F/U\par DUE FOR COLONOSCOPY ,NEEDS REF

## 2020-08-26 NOTE — ASSESSMENT
[FreeTextEntry1] : 65 Y OLD MALE WITH ARTHRALGIA AT MULTIPLE SITES= TO SEE RHEUMA\par GERD AND COLON DIVERTICULOSIS= TO SEE GI \par DM = ONE TOUCH ULTRA DEVICE\par BPH= TO SEE \par RTO IN 2-3 M OR PRN \par GENITAL HERPES= VALTREX RX SENT

## 2020-08-26 NOTE — REVIEW OF SYSTEMS
[Heartburn] : heartburn [Joint Pain] : joint pain [Frequency] : frequency [Muscle Pain] : muscle pain [Joint Stiffness] : joint stiffness [Back Pain] : back pain [Negative] : Psychiatric

## 2020-08-26 NOTE — PHYSICAL EXAM
[Soft] : abdomen soft [Normal] : no carotid or abdominal bruits heard, no varicosities, pedal pulses are present, no peripheral edema, no extremity clubbing or cyanosis and no palpable aorta [Non Tender] : non-tender

## 2020-09-04 ENCOUNTER — APPOINTMENT (OUTPATIENT)
Dept: UROLOGY | Facility: CLINIC | Age: 65
End: 2020-09-04
Payer: MEDICAID

## 2020-09-04 VITALS
HEART RATE: 91 BPM | HEIGHT: 65 IN | BODY MASS INDEX: 25.99 KG/M2 | DIASTOLIC BLOOD PRESSURE: 91 MMHG | WEIGHT: 156 LBS | SYSTOLIC BLOOD PRESSURE: 151 MMHG | TEMPERATURE: 98 F

## 2020-09-04 DIAGNOSIS — N40.0 BENIGN PROSTATIC HYPERPLASIA WITHOUT LOWER URINARY TRACT SYMPMS: ICD-10-CM

## 2020-09-04 DIAGNOSIS — A60.02 HERPESVIRAL INFECTION OF OTHER MALE GENITAL ORGANS: ICD-10-CM

## 2020-09-04 DIAGNOSIS — I10 ESSENTIAL (PRIMARY) HYPERTENSION: ICD-10-CM

## 2020-09-04 DIAGNOSIS — E11.9 TYPE 2 DIABETES MELLITUS W/OUT COMPLICATIONS: ICD-10-CM

## 2020-09-04 DIAGNOSIS — A60.00 HERPESVIRAL INFECTION OF UROGENITAL SYSTEM, UNSPECIFIED: ICD-10-CM

## 2020-09-04 PROCEDURE — 99204 OFFICE O/P NEW MOD 45 MIN: CPT

## 2020-09-04 NOTE — PHYSICAL EXAM
[General Appearance - Well Developed] : well developed [General Appearance - Well Nourished] : well nourished [Normal Appearance] : normal appearance [Well Groomed] : well groomed [General Appearance - In No Acute Distress] : no acute distress [Edema] : no peripheral edema [Respiration, Rhythm And Depth] : normal respiratory rhythm and effort [Exaggerated Use Of Accessory Muscles For Inspiration] : no accessory muscle use [Abdomen Soft] : soft [Abdomen Tenderness] : non-tender [Urethral Meatus] : meatus normal [Costovertebral Angle Tenderness] : no ~M costovertebral angle tenderness [Urinary Bladder Findings] : the bladder was normal on palpation [Scrotum] : the scrotum was normal [No Prostate Nodules] : no prostate nodules [Testes Mass (___cm)] : there were no testicular masses [Normal Station and Gait] : the gait and station were normal for the patient's age [] : no rash [No Focal Deficits] : no focal deficits [Affect] : the affect was normal [Oriented To Time, Place, And Person] : oriented to person, place, and time [Mood] : the mood was normal [Not Anxious] : not anxious [No Palpable Adenopathy] : no palpable adenopathy

## 2020-09-04 NOTE — ASSESSMENT
[FreeTextEntry1] : Very pleasant 65-year-old gentleman who presents for evaluation of genital herpes and screening for prostate cancer\par -Labs reviewed–PSA 1.16\par -Urinalysis reviewed–negative\par -Discussed the rationale for screening for prostate cancer with PSA and digital rectal exam. We discussed risk factors for the development of prostate cancer.  We also discussed the natural history of prostate cancer.\par -Discussed that his risk for prostate cancer is low, given his negative LUCHO and low PSA\par -Discussed different treatment options, including both episodic and prophylactic treatment for genital herpes\par -At this time he would like to start episodic treatment with Valtrex\par -We discussed risks and benefits of Valtrex\par -Follow-up in 6 months or sooner if necessary

## 2020-09-04 NOTE — HISTORY OF PRESENT ILLNESS
[FreeTextEntry1] : Very pleasant 65-year-old gentleman who presents for evaluation of genital herpes and screening for prostate cancer.  Patient feels well.  He denies dysuria.  No hematuria.  No flank pain or suprapubic pain.  He reports nocturia x0.  He reports a moderate urinary stream.  Recent PSA was 1.16.  He reports no history of an elevated PSA in the past.  He reports no family history of  malignancy, including prostate cancer.  He does not smoke.\par \par Patient also reports a history of genital herpes.  He reports outbreaks every 2 years.  He reports that he recently had COVID and following resolution of COVID had an outbreak of herpes.  This spontaneously regressed.  He has received Valtrex in the past.  [None] : no symptoms

## 2020-09-10 ENCOUNTER — APPOINTMENT (OUTPATIENT)
Dept: RHEUMATOLOGY | Facility: CLINIC | Age: 65
End: 2020-09-10
Payer: MEDICAID

## 2020-09-10 VITALS
RESPIRATION RATE: 12 BRPM | WEIGHT: 162 LBS | OXYGEN SATURATION: 96 % | TEMPERATURE: 97.9 F | HEIGHT: 65 IN | HEART RATE: 88 BPM | BODY MASS INDEX: 26.99 KG/M2

## 2020-09-10 VITALS — DIASTOLIC BLOOD PRESSURE: 78 MMHG | SYSTOLIC BLOOD PRESSURE: 122 MMHG

## 2020-09-10 DIAGNOSIS — M54.2 CERVICALGIA: ICD-10-CM

## 2020-09-10 DIAGNOSIS — K57.90 DIVERTICULOSIS OF INTESTINE, PART UNSPECIFIED, W/OUT PERFORATION OR ABSCESS W/OUT BLEEDING: ICD-10-CM

## 2020-09-10 DIAGNOSIS — I10 ESSENTIAL (PRIMARY) HYPERTENSION: ICD-10-CM

## 2020-09-10 DIAGNOSIS — M54.9 DORSALGIA, UNSPECIFIED: ICD-10-CM

## 2020-09-10 DIAGNOSIS — Z82.49 FAMILY HISTORY OF ISCHEMIC HEART DISEASE AND OTHER DISEASES OF THE CIRCULATORY SYSTEM: ICD-10-CM

## 2020-09-10 DIAGNOSIS — Z80.0 FAMILY HISTORY OF MALIGNANT NEOPLASM OF DIGESTIVE ORGANS: ICD-10-CM

## 2020-09-10 PROCEDURE — 99214 OFFICE O/P EST MOD 30 MIN: CPT

## 2020-09-10 PROCEDURE — 99205 OFFICE O/P NEW HI 60 MIN: CPT

## 2020-09-10 NOTE — REVIEW OF SYSTEMS
[Feeling Poorly] : feeling poorly [Feeling Tired] : feeling tired [Chest Pain] : no chest pain [Palpitations] : no palpitations [Leg Claudication] : no intermittent leg claudication [Lower Ext Edema] : no extremity edema [Shortness Of Breath] : no shortness of breath [Wheezing] : no wheezing [Cough] : no cough [SOB on Exertion] : no shortness of breath during exertion [Orthopnea] : no orthopnea [PND] : no PND [Abdominal Pain] : no abdominal pain [Vomiting] : no vomiting [Constipation] : no constipation [Diarrhea] : no diarrhea [Heartburn] : no heartburn [Melena] : no melena [Arthralgias] : arthralgias [Joint Pain] : joint pain [Joint Swelling] : no joint swelling [Joint Stiffness] : no joint stiffness [Skin Lesions] : no skin lesions [Skin Wound] : no skin wound [Confused] : no confusion [Convulsions] : no convulsions [Dizziness] : no dizziness [Fainting] : no fainting [Muscle Weakness] : no muscle weakness [Easy Bleeding] : no tendency for easy bleeding [Easy Bruising] : no tendency for easy bruising [FreeTextEntry9] : mm pain

## 2020-09-10 NOTE — HISTORY OF PRESENT ILLNESS
[FreeTextEntry1] : 66 y/o M here for consultation. \par \par Feels pain in upper back, shoulders, Pain in thighs, down his legs. Worse when walking up the basement. Not sure if stiffness. \par \par Pt feels voltaren improves his pain. Pain is constant, and doesn't vary between days. No joint swelling. \par \par Pt had symptoms before this. Has had this for 5 years. \par \par h/a in back of neck, radiates to back of head. On and off. Not daily. \par cough when pt turns a/c and it is cold. \par \par No vision changes, fevers, chills, no oral ulcers, swollen, SOB, n/v/d. No sensory loss, but feels LE weakness. No weight loss. \par \par Had COVID19 in 3/20. Hospitalized for 3 weeks. \par \par Pt works in accounting. Now retired. Has been fatigued and stressed w family issues in [facility].

## 2020-09-10 NOTE — DATA REVIEWED
[FreeTextEntry1] : Labs reviewed from 8/20\par CMP normal besides slight elevation in glucose in 111. TSH, vit D 25 wnl. CBC w slight leukocytosis in high 13s. \par \par Xrays of cervical spine reviewed, C3-C4, C4-C5 retrolisthesis, mild C6-C7 disc space narrowing, mod multilevel sponylosis, mod left C3-C4 and left C5-C6, C6-C7 and mod R C6-C7 and mild C4-C5 foraminal narrowing.

## 2020-09-10 NOTE — ASSESSMENT
[FreeTextEntry1] : 66 y/o M with chronic myalgias, arthralgias\par =proximal joint/mm predominance, although to lesser extent in distal areas\par =depression/fatigue\par =Xrays of cervical spine showing extensive mild-mod degenerative changes of cervical spine with spondylosis, foraminal narrowing, retrolisthesis. \par \par There is not doubt there is a pain component from his extensive degenerative changes in cervical spine, however,  pain seems too diffuse to be entirely explained by this. More extensive dz in thoracic and lumbar spine? Also pt w predominant proximal mm/joint involvement which puts PMR into ddx, however, physical exam not impressive with stiffness and degree of tenderness. Another consideration is FM given whole body pain, fatigue, anxiety, but dx of exclusion. \par \par Plan\par =Xrays of thoracic, lumbar spine\par =will obtain ESR, CRP and IL6 to evaluate for PMR\par =RTO next week to discuss results and tx plan

## 2020-09-11 ENCOUNTER — APPOINTMENT (OUTPATIENT)
Dept: GASTROENTEROLOGY | Facility: CLINIC | Age: 65
End: 2020-09-11
Payer: MEDICAID

## 2020-09-11 ENCOUNTER — LABORATORY RESULT (OUTPATIENT)
Age: 65
End: 2020-09-11

## 2020-09-11 VITALS
OXYGEN SATURATION: 97 % | HEIGHT: 65 IN | DIASTOLIC BLOOD PRESSURE: 88 MMHG | HEART RATE: 80 BPM | WEIGHT: 162 LBS | BODY MASS INDEX: 26.99 KG/M2 | TEMPERATURE: 98.3 F | SYSTOLIC BLOOD PRESSURE: 139 MMHG

## 2020-09-11 DIAGNOSIS — K21.9 GASTRO-ESOPHAGEAL REFLUX DISEASE W/OUT ESOPHAGITIS: ICD-10-CM

## 2020-09-11 DIAGNOSIS — R10.13 EPIGASTRIC PAIN: ICD-10-CM

## 2020-09-11 DIAGNOSIS — K59.00 CONSTIPATION, UNSPECIFIED: ICD-10-CM

## 2020-09-11 DIAGNOSIS — R10.12 LEFT UPPER QUADRANT PAIN: ICD-10-CM

## 2020-09-11 DIAGNOSIS — K76.0 FATTY (CHANGE OF) LIVER, NOT ELSEWHERE CLASSIFIED: ICD-10-CM

## 2020-09-11 DIAGNOSIS — Z01.818 ENCOUNTER FOR OTHER PREPROCEDURAL EXAMINATION: ICD-10-CM

## 2020-09-11 PROCEDURE — 99213 OFFICE O/P EST LOW 20 MIN: CPT

## 2020-09-11 RX ORDER — PANTOPRAZOLE 40 MG/1
40 TABLET, DELAYED RELEASE ORAL
Qty: 30 | Refills: 3 | Status: ACTIVE | COMMUNITY
Start: 2020-09-11 | End: 1900-01-01

## 2020-09-11 NOTE — HISTORY OF PRESENT ILLNESS
[None] : had no significant interval events [Hiatus Hernia] : hiatus hernia [GERD] : gastroesophageal reflux disease [Kidney Stone] : kidney stone [Cholelithiasis] : cholelithiasis [Cholecystectomy] : cholecystectomy [Nausea] : denies nausea [Diarrhea] : denies diarrhea [Yellow Skin Or Eyes (Jaundice)] : denies jaundice [Vomiting] : denies vomiting [Rectal Pain] : denies rectal pain [Heartburn] : heartburn [Constipation] : constipation [Abdominal Pain] : abdominal pain [Abdominal Swelling] : abdominal swelling [Wt Loss ___ Lbs] : no recent weight loss [Wt Gain ___ Lbs] : no recent weight gain [Pancreatitis] : no pancreatitis [Peptic Ulcer Disease] : no peptic ulcer disease [Inflammatory Bowel Disease] : no inflammatory bowel disease [Diverticulitis] : no diverticulitis [Alcohol Abuse] : no alcohol abuse [Irritable Bowel Syndrome] : no irritable bowel syndrome [Abdominal Surgery] : no abdominal surgery [Appendectomy] : no appendectomy [Malignancy] : no malignancy [de-identified] : (-) smoking, (-) ETOH, (-) IVDA\par  [de-identified] : The patient denies any prior exposure to hepatitis A, B or C. The patient denies any large doses of nonsteroidal anti-inflammatory drugs or acetaminophen. The patient denies sharing needles, razors, nail clippers, nail files, scissors, et cetera. The patient denies any EtOH abuse, cocaine use or intravenous drug use. The patient denies any prior blood transfusions, sexual indiscretions, tattoos or piercing. The patient admits to having prior surgery. The history of surgery is significant for a prior left inguinal hernia repair. The patient admits to a family history of GI and liver problems. The patient`s father had a history of unknown and sister with diverticulitis and GI bleeding.  The patient states that he is feeling uncomfortable.   The patient admits to being exposed to COVID 19 infection.  The patient needed for hospitalization.  The patient was recently hospitalized at [facility] in [place] for Corona virus.   The patient had symptoms of shortness of breath, nasal congestion, chest discomfort, fever, chill, loss of taste, loss of smell, headaches and weight loss.  The diagnosis was confirmed with SARS Related Coronavirus PCR RNA nasopharyngeal swab.  The patient has positive COVID 19 IgG antibodies.  The patient denies any jaundice or pruritus.  The patient complains of occasional lower back pain. The patient complains of abdominal pain.  The patient describes the abdominal pain as a crampy, intermittent midepigastric and periumbilical discomfort that is nonradiating in nature.  The abdominal pain is worse with stress.  The abdominal pain is worse with meals and improves with passing gas or having a bowel movement.  The abdominal pain is described as being mild in nature.  The abdominal pain occurs at night and in the morning.  The abdominal pain can occur at any time.   The abdominal pain has never awakened the patient from sleep.  The abdominal pain is not relieved with medication.  The abdominal pain is associated with abdominal gas and bloating.  The patient denies any nausea or vomiting.  The patient complains of gastroesophageal reflux disease but denies any dysphagia.  The patient denies taking medications for the gastroesophageal reflux disease.  The gastroesophageal reflux disease is worse after meals and late at night and in the early morning. The gastroesophageal reflux disease previously improved with proton pump inhibitors, H2 blockers and antacids.   The patient denies any atypical chest pain, shortness of breath or palpitations.  The patient admits to occasional episodes of diaphoresis.  The patient complains of constipation but denies any diarrhea.  The patient has 1 to 3 bowel movements every 1 to 2 days.  The patient complains of a change in bowel habits.  The patient complains of a change in caliber of stool.   The patient denies having mucus discharge with the bowel movements.  The patient complains of occasional lower GI bleeding but denies any melena or hematemesis.  The lower GI bleeding is associated with hemorrhoids and constipation.  The patient complains of rectal pain and rectal pruritus.  The patient denies any weight loss or anorexia.  He denies any fevers or chills.  The upper endoscopy performed at the office on September 11, 2018 revealed mild distal esophagitis, prominent E-G junction fold and mild diffuse bile gastritis. The pathology revealed distal esophagus with squamous mucosa with mild gastroesophageal reflux disease with no evidence of eosinophilic esophagitis or fungal microorganisms, GE junction with no squamous mucosa identified, gastric mucosa with chronic inflammation with no evidence of Aguilar`s esophagus or dysplasia with no evidence of intestinal metaplasia, Helicobacter pylori or fungal microorganisms, gastric antral mucosa with chronic inactive gastritis with no evidence of intestinal metaplasia or dysplasia that was negative for Helicobacter pylori, gastric body mucosa with chronic inactive gastritis with no evidence of intestinal metaplasia or dysplasia that was negative for Helicobacter pylori and duodenal mucosa with normal villous architecture with mild chronic inflammation of the lamina propria with no evidence of intraepithelial lymphocytosis, celiac disease, Giardia or parasites.  The colonoscopy to the terminal ileum performed at the office on September 13, 2018 revealed moderate pandiverticulosis that was primarily concentrated to the left colon, a long and tortuous colon and small internal hemorrhoids. The patient tolerated the procedures well.   The CAT scan of the abdomen and pelvis with IV contrast performed on August 13, 2018 revealed prior cholecystectomy with normal bile ducts, no pancreatic lesion or other abnormality. There is a 0.2 cm nonobstructive mid right renal calculus, a benign 0.5 cm mid right renal cysts, moderate prosthetic enlargement and bladder floor compression with at least mild bladder wall thickening or hypertrophy. There is diverticulosis, especially severe in the left colon and particularly in the proximal to mid sigmoid colon. The proximal to mid sigmoid colon also demonstrates mild loss of serosal margins locally. Therefore, mild diverticulitis of indeterminate chronicity cannot be excluded, but no encapsulated paracolic fluid collection. The abdominal ultrasound performed on August 29, 2016 revealed probable diffuse fatty infiltration of the liver, status post cholecystectomy.  The blood work performed on June 20, 2020 revealed an elevated WBC count of 13.87, an elevated glucose of 103 mg/dl, an elevated anion gap of 19 mmol/L, an elevated triglyceride level of 198 mg/dl and elevated hemoglobin A 1-c of 7.2%.  The blood work for COVID 19 IgG was positive at 7.42 index.  The blood work performed by his PMD on July 26, 2018 was significant for an elevated blood glucose of 354 mg/dL, an elevated total cholesterol/triglyceride level of 212/398 mg/dL, respectively, and elevated hemoglobin A1c of 13.4%, and an elevated ALT of 52 U/L. The patient  is being followed by his urologist, Dr. Choco Barksdale. The patient admits to a family history of GI and liver problems. The patient`s father had a history of unknown and sister with diverticulitis and GI bleeding.

## 2020-09-11 NOTE — ASSESSMENT
[FreeTextEntry1] : Abdominal Pain: The patient complains of abdominal pain. The patient is to avoid nonsteroidal anti-inflammatory drugs and aspirin. I recommend a trial of Pantoprazole 40 mg once a day for 3 months for the symptoms.  \par Dyspepsia: The patient complains of dyspeptic symptoms.  The patient was advised to abide by an anti-gas diet.  The patient was given a pamphlet for anti-gas.  The patient and I reviewed the anti-gas diet at length. The patient is to start on a trial of Phazyme one tablet 3 times a day p.r.n. abdominal pain and gas.\par GERD: The patient was advised to avoid late-night meals and dietary indiscretions.  The patient was advised to avoid fried and fatty foods.  The patient was advised to abide by an anti-GERD diet. The patient was given a pamphlet for anti-GERD.  The patient and I reviewed the anti-GERD diet at length. I recommend a trial of Pantoprazole 40 mg once a day x 3 months for the symptoms.\par Constipation: The patient complains of constipation. I recommend a high-fiber diet. I recommend a trial of a probiotic such as Align once a day. I recommend a trial of Metamucil once a day for fiber supplementation. If the symptoms persist, the patient may require a trial of Linzess 145 mcg once a day for the constipation.  The patient agreed and will followup to reassess the symptoms.  \par Diverticulosis: I recommend a high-fiber diet and avoid seeds. The patient is to consider a trial of Metamucil once a day for fiber supplementation. The patient is to also consider a trial of a probiotic such as Align once a day. \par Internal Hemorrhoids: The patient is to consider a trial of Anusol H. C. suppositories one per rectum nightly and Anusol HC2 .5% cream apply to affected area twice a day p.r.n. hemorrhoidal bleeding or pain. \par Gastritis: The patient has a history of gastritis. The patient is to avoid nonsteroidal anti-inflammatory drugs and aspirin. I recommend a trial of Pantoprazole 40 mg once a day p.r.n. dyspeptic symptoms. \par Reflux Esophagitis: The patient had reflux esophagitis noted on prior upper endoscopy. The patient was advised to avoid late-night meals and dietary indiscretions. The patient was advised to avoid fried and fatty foods. The patient was advised to abide by an anti-GERD diet. The patient was given a pamphlet for anti-GERD. The patient and I reviewed the anti-GERD diet at length. \par Fatty Liver: The patient denies any jaundice or pruritus. The patient denies any alcohol use. The patient denies taking large doses of nonsteroidal anti-inflammatory drugs or acetaminophen. The findings are suggestive of fatty liver. The patient and I had a long discussion regarding the risks of fatty liver progressing to cirrhosis. The patient was told of the possible increased risk of developing liver failure, cirrhosis, ascites, GI bleeding secondary to varices, hepatic encephalopathy, bleeding tendencies and liver cancer. The patient was told of the importance of follow-up. The patient was advised to follow up every 6 months for blood work and imaging studies. The patient agreed and will follow up. The patient was advised to lose weight. I recommend a trial of vitamin E supplementation for the fatty liver. If the liver enzymes remain elevated, the patient may require a trial of Pioglitazone for the fatty liver. I recommend avoid alcohol and hepato-toxic agents. The patient was also advised to avoid NSAIDs, Acetaminophen and any other hepatotoxic drugs. The patient was also advised not to share needles, razors, scissors, nail clippers, etc.. The patient is to continue close follow-up in our office for blood work and exams. If the liver enzymes remain elevated, the patient may require a CT guided liver biopsy to assess the liver parenchyma and for possible treatment. We had a long discussion regarding the risks and benefits of the procedure. The patient was told of the risks of bleeding, perforation, infections, emergency surgery and missing lesions. The patient agreed and will follow-up to reassess the symptoms. \par Upper Endoscopy: I recommend an upper endoscopy  to assess the symptoms for peptic ulcer disease versus esophagitis.  The patient was told of the risks and benefits of the procedure.  The patient was told of the risks of perforation, emergency surgery, bleeding, infections and missed lesions. The patient agreed and will schedule for procedure. The patient is to be n.p.o. after midnight.  The patient is to return for the procedure. \par I recommend a repeat colonoscopy in 3 years to reassess for colonic polyps unless symptomatic. The patient agreed and will follow up for the procedure. \par If the symptoms persist, the patient may require a colonoscopy to assess the colon.  The patient was told of the risks and benefits of the procedure.  The patient was told of the risks of perforation, emergency surgery, bleeding, infections and missed lesions.  The patient agreed and will follow-up to reassess the symptoms.  \par Blood work: The patient had blood work performed by his PMD. I will try to obtain the blood work results. \par Imaging Study: I recommend an imaging study to assess the symptoms. I recommend an abdominal ultrasound to assess the liver parenchyma and for liver lesions. \par Follow-up: The patient is to follow-up in the office in 3 months to reassess the symptoms. The patient was told to call the office if any further problems. \par \par \par \par

## 2020-09-13 LAB
CRP SERPL-MCNC: 0.12 MG/DL
ERYTHROCYTE [SEDIMENTATION RATE] IN BLOOD BY WESTERGREN METHOD: 23 MM/HR

## 2020-09-15 LAB
AFP-TM SERPL-MCNC: 2.3 NG/ML
ALBUMIN SERPL ELPH-MCNC: 5.1 G/DL
ALP BLD-CCNC: 67 U/L
ALT SERPL-CCNC: 18 U/L
AMYLASE/CREAT SERPL: 49 U/L
ANA SER IF-ACNC: NEGATIVE
ANION GAP SERPL CALC-SCNC: 17 MMOL/L
APTT BLD: 36.1 SEC
AST SERPL-CCNC: 18 U/L
BASOPHILS # BLD AUTO: 0.05 K/UL
BASOPHILS NFR BLD AUTO: 0.5 %
BILIRUB SERPL-MCNC: 0.4 MG/DL
BUN SERPL-MCNC: 18 MG/DL
CALCIUM SERPL-MCNC: 10 MG/DL
CHLORIDE SERPL-SCNC: 99 MMOL/L
CHOLEST SERPL-MCNC: 167 MG/DL
CHOLEST/HDLC SERPL: 5 RATIO
CO2 SERPL-SCNC: 25 MMOL/L
CREAT SERPL-MCNC: 1.04 MG/DL
EOSINOPHIL # BLD AUTO: 0.13 K/UL
EOSINOPHIL NFR BLD AUTO: 1.4 %
FERRITIN SERPL-MCNC: 275 NG/ML
GGT SERPL-CCNC: 21 U/L
GLIADIN IGA SER QL: 18.8 UNITS
GLIADIN IGG SER QL: <5 UNITS
GLIADIN PEPTIDE IGA SER-ACNC: NEGATIVE
GLIADIN PEPTIDE IGG SER-ACNC: NEGATIVE
GLUCOSE SERPL-MCNC: 99 MG/DL
HBV CORE IGG+IGM SER QL: NONREACTIVE
HBV CORE IGM SER QL: NONREACTIVE
HBV SURFACE AB SER QL: NONREACTIVE
HBV SURFACE AG SER QL: NONREACTIVE
HCT VFR BLD CALC: 45.7 %
HCV AB SER QL: NONREACTIVE
HCV S/CO RATIO: 0.19 S/CO
HDLC SERPL-MCNC: 34 MG/DL
HEPATITIS A IGG ANTIBODY: REACTIVE
HGB BLD-MCNC: 14.8 G/DL
IGA SER QL IEP: 394 MG/DL
IMM GRANULOCYTES NFR BLD AUTO: 0.5 %
IRON SATN MFR SERPL: 22 %
IRON SERPL-MCNC: 58 UG/DL
LDLC SERPL CALC-MCNC: 90 MG/DL
LPL SERPL-CCNC: 75 U/L
LYMPHOCYTES # BLD AUTO: 2.32 K/UL
LYMPHOCYTES NFR BLD AUTO: 25.4 %
MAN DIFF?: NORMAL
MCHC RBC-ENTMCNC: 30.5 PG
MCHC RBC-ENTMCNC: 32.4 GM/DL
MCV RBC AUTO: 94 FL
MITOCHONDRIA AB SER IF-ACNC: NORMAL
MONOCYTES # BLD AUTO: 0.69 K/UL
MONOCYTES NFR BLD AUTO: 7.5 %
NEUTROPHILS # BLD AUTO: 5.9 K/UL
NEUTROPHILS NFR BLD AUTO: 64.7 %
PLATELET # BLD AUTO: 221 K/UL
POTASSIUM SERPL-SCNC: 4.3 MMOL/L
PROT SERPL-MCNC: 7.8 G/DL
RBC # BLD: 4.86 M/UL
RBC # FLD: 13 %
RHEUMATOID FACT SER QL: <10 IU/ML
SMOOTH MUSCLE AB SER QL IF: NORMAL
SODIUM SERPL-SCNC: 142 MMOL/L
TIBC SERPL-MCNC: 265 UG/DL
TRIGL SERPL-MCNC: 215 MG/DL
TSH SERPL-ACNC: 1.06 UIU/ML
TTG IGA SER IA-ACNC: <1.2 U/ML
TTG IGA SER-ACNC: NEGATIVE
TTG IGG SER IA-ACNC: 3 U/ML
TTG IGG SER IA-ACNC: NEGATIVE
UIBC SERPL-MCNC: 207 UG/DL
WBC # FLD AUTO: 9.14 K/UL

## 2020-09-15 RX ORDER — PANTOPRAZOLE 40 MG/1
40 TABLET, DELAYED RELEASE ORAL
Qty: 30 | Refills: 3 | Status: ACTIVE | COMMUNITY
Start: 2020-09-15 | End: 1900-01-01

## 2020-09-16 LAB
HBV E AB SER QL: NEGATIVE
HBV E AG SER QL: NEGATIVE

## 2020-09-17 ENCOUNTER — APPOINTMENT (OUTPATIENT)
Dept: RHEUMATOLOGY | Facility: CLINIC | Age: 65
End: 2020-09-17
Payer: MEDICAID

## 2020-09-17 VITALS
DIASTOLIC BLOOD PRESSURE: 90 MMHG | HEIGHT: 65 IN | BODY MASS INDEX: 26.99 KG/M2 | RESPIRATION RATE: 12 BRPM | SYSTOLIC BLOOD PRESSURE: 140 MMHG | OXYGEN SATURATION: 97 % | WEIGHT: 162 LBS | TEMPERATURE: 97.6 F | HEART RATE: 75 BPM

## 2020-09-17 DIAGNOSIS — M79.7 FIBROMYALGIA: ICD-10-CM

## 2020-09-17 DIAGNOSIS — M50.33 OTHER CERVICAL DISC DEGENERATION, CERVICOTHORACIC REGION: ICD-10-CM

## 2020-09-17 DIAGNOSIS — M25.50 PAIN IN UNSPECIFIED JOINT: ICD-10-CM

## 2020-09-17 PROCEDURE — 99214 OFFICE O/P EST MOD 30 MIN: CPT

## 2020-09-17 RX ORDER — GABAPENTIN 300 MG/1
300 CAPSULE ORAL
Qty: 180 | Refills: 0 | Status: ACTIVE | COMMUNITY
Start: 2020-09-17 | End: 1900-01-01

## 2020-09-17 NOTE — REVIEW OF SYSTEMS
[Feeling Poorly] : feeling poorly [Feeling Tired] : feeling tired [Arthralgias] : arthralgias [Joint Pain] : joint pain [Chest Pain] : no chest pain [Palpitations] : no palpitations [Leg Claudication] : no intermittent leg claudication [Lower Ext Edema] : no extremity edema [Shortness Of Breath] : no shortness of breath [Wheezing] : no wheezing [Cough] : no cough [Abdominal Pain] : no abdominal pain [Vomiting] : no vomiting [Constipation] : no constipation [Diarrhea] : no diarrhea [Heartburn] : no heartburn [Melena] : no melena [Joint Swelling] : no joint swelling [Joint Stiffness] : no joint stiffness [Skin Lesions] : no skin lesions [Skin Wound] : no skin wound [Dizziness] : no dizziness [Fainting] : no fainting [Muscle Weakness] : no muscle weakness [FreeTextEntry9] : mm pain

## 2020-09-17 NOTE — ASSESSMENT
[FreeTextEntry1] : 64 y/o M FM, DD \par =diffuse arthralgias, myalgias w no elevation in inflammatory markers\par =depression/fatigue\par =Xrays of cervical spine showing extensive mild-mod degenerative changes of cervical spine with spondylosis, foraminal narrowing, retrolisthesis. More mild degenerative findings in thoracic and lumbar spine. \par \par Gabapentin can target both radicular pain from DDD, and FM. \par \par Plan\par =rx gabapentin; counseled on drowsiness of that gabapentin could cause, and to uptitrate as tolerated. \par =PT; referral given \par =ACR education handed on FM\par =RTO next year when return from trip to [facility]

## 2020-09-17 NOTE — PHYSICAL EXAM
[General Appearance - Well Nourished] : well nourished [General Appearance - Well Developed] : well developed [Sclera] : the sclera and conjunctiva were normal [Auscultation Breath Sounds / Voice Sounds] : lungs were clear to auscultation bilaterally [Heart Sounds] : normal S1 and S2 [Heart Sounds Gallop] : no gallops [Murmurs] : no murmurs [Heart Sounds Pericardial Friction Rub] : no pericardial rub [] : no rash [Skin Lesions] : no skin lesions [Oriented To Time, Place, And Person] : oriented to person, place, and time [FreeTextEntry1] : no synovitis. Tenderness in shoulders, upper arms, forearms, thighs, calves. No synovitis.

## 2020-09-17 NOTE — HISTORY OF PRESENT ILLNESS
[___ Week(s) Ago] : [unfilled] week(s) ago [FreeTextEntry1] : Pt still with pain. No other new symptoms. No fevers, chills, SOB, n/v/d, abdominal pain, rashes. Eager to try physical therapy and get better.

## 2020-09-17 NOTE — DATA REVIEWED
[FreeTextEntry1] : Labs reviewed from last week. Normal ESR, CRP, IL6. \par \par Xrays reviewed last week of thoracic spine, lumbar spine showing mild degenerative changes.

## 2020-09-18 RX ORDER — PANTOPRAZOLE 40 MG/1
40 TABLET, DELAYED RELEASE ORAL
Qty: 90 | Refills: 3 | Status: ACTIVE | COMMUNITY
Start: 2020-09-18 | End: 1900-01-01

## 2020-09-19 RX ORDER — VALACYCLOVIR 500 MG/1
500 TABLET, FILM COATED ORAL TWICE DAILY
Qty: 180 | Refills: 2 | Status: ACTIVE | COMMUNITY
Start: 2020-09-04 | End: 1900-01-01

## 2020-09-26 ENCOUNTER — APPOINTMENT (OUTPATIENT)
Dept: DISASTER EMERGENCY | Facility: CLINIC | Age: 65
End: 2020-09-26

## 2020-09-26 DIAGNOSIS — Z01.818 ENCOUNTER FOR OTHER PREPROCEDURAL EXAMINATION: ICD-10-CM

## 2020-09-26 LAB — SARS-COV-2 N GENE NPH QL NAA+PROBE: NOT DETECTED

## 2020-09-29 ENCOUNTER — RESULT REVIEW (OUTPATIENT)
Age: 65
End: 2020-09-29

## 2020-09-29 ENCOUNTER — APPOINTMENT (OUTPATIENT)
Dept: GASTROENTEROLOGY | Facility: HOSPITAL | Age: 65
End: 2020-09-29

## 2020-09-29 ENCOUNTER — OUTPATIENT (OUTPATIENT)
Dept: OUTPATIENT SERVICES | Facility: HOSPITAL | Age: 65
LOS: 1 days | End: 2020-09-29
Payer: MEDICAID

## 2020-09-29 DIAGNOSIS — Z98.890 OTHER SPECIFIED POSTPROCEDURAL STATES: Chronic | ICD-10-CM

## 2020-09-29 DIAGNOSIS — Z90.49 ACQUIRED ABSENCE OF OTHER SPECIFIED PARTS OF DIGESTIVE TRACT: Chronic | ICD-10-CM

## 2020-09-29 DIAGNOSIS — K21.9 GASTRO-ESOPHAGEAL REFLUX DISEASE WITHOUT ESOPHAGITIS: ICD-10-CM

## 2020-09-29 PROCEDURE — 88312 SPECIAL STAINS GROUP 1: CPT

## 2020-09-29 PROCEDURE — 88305 TISSUE EXAM BY PATHOLOGIST: CPT

## 2020-09-29 PROCEDURE — 88312 SPECIAL STAINS GROUP 1: CPT | Mod: 26

## 2020-09-29 PROCEDURE — 88305 TISSUE EXAM BY PATHOLOGIST: CPT | Mod: 26

## 2020-09-29 PROCEDURE — 43239 EGD BIOPSY SINGLE/MULTIPLE: CPT

## 2020-09-29 NOTE — CHART NOTE - NSCHARTNOTEFT_GEN_A_CORE
Esophagogastroduodenoscopy Report:    Indication:             Abdominal pain, dyspepsia, GERD  Referring MD:       Dr. Denver Vieira  Instrument:  #        [id number]  Anesthesia:            MAC    Consent:  Informed consent was obtained from the patient after providing any opportunity for questions  Procedure: The gastroscope was gently passed through the incisoral orifice into the oral cavity and under direct visualization the esophagus was intubated. The endoscope was passed down the esophagus, through the stomach and into proximal jejunum. Color, texture, mucosa and anatomy of the esophagus, stomach, and duodenum were carefully examined with the scope. The patient tolerated the procedure well. After completion of the examination, the patient was transferred to the recovery room.     Procedure: Upper endoscopy and biopsies    Preparation: NPO     Findings:     Oropharynx:	   Normal appearing oropharynx.  Esophagus:	   Normal appearing esophagus with no ulcers, erosions, erythema noted.  Biopsy taken.  EG-junction:	   Normal appearing E-G junction at 40 cm. (+) Small hiatal hernia noted. No ulcers, erosions or erythema noted.  Cardia:	                 Mild diffuse erythema suggestive of gastritis but no ulcers or erosions noted.  (+) Bile suctioned.  Body:	                 Mild diffuse erythema suggestive of gastritis but no ulcers or erosions noted. Biopsy taken.  Antrum:	                 Mild diffuse erythema with scattered antral erosions suggestive of gastritis but no ulcers noted. Biopsy taken.  Pylorus:	                 Normal appearing pylorus and pyloric channel with no ulcers, erosions or erythema noted.     Duodenal Bulb:         Normal appearing duodenal mucosa with no ulcers, erosions or erythema noted. Biopsy taken.  2nd portion:	   Normal appearing duodenal mucosa with no ulcers, erosions or erythema noted.  Biopsy taken.  3rd portion:	   Not visualized.      EBL:0    Impression: Small hiatal hernia, Mild diffuse bile gastritis    Plan:    1. Avoid late-night meals and dietary indiscretions.  2. Avoid fried and fatty foods.  3. Avoid nonsteroidal anti-inflammatory drugs and aspirin.  4. Will check path results.  5. Recommend a trial of pantoprazole 40 mg once a day for 3 months.  6. Followup in office in 4 weeks to reassess the symptoms and discuss the findings.      Procedure Start Time:  12:17 pm  Procedure End Time:    12:24 pm      Attending:       Umair Gustafson M.D.

## 2020-09-30 RX ORDER — NIFEDIPINE 30 MG/1
30 TABLET, EXTENDED RELEASE ORAL
Qty: 90 | Refills: 0 | Status: ACTIVE | COMMUNITY
Start: 2020-05-22 | End: 1900-01-01

## 2020-09-30 RX ORDER — METOPROLOL SUCCINATE 50 MG/1
50 TABLET, EXTENDED RELEASE ORAL DAILY
Qty: 90 | Refills: 0 | Status: ACTIVE | COMMUNITY
Start: 2020-06-23 | End: 1900-01-01

## 2020-10-01 ENCOUNTER — TRANSCRIPTION ENCOUNTER (OUTPATIENT)
Age: 65
End: 2020-10-01

## 2020-10-01 ENCOUNTER — APPOINTMENT (OUTPATIENT)
Dept: HEART AND VASCULAR | Facility: CLINIC | Age: 65
End: 2020-10-01

## 2020-10-01 LAB — SURGICAL PATHOLOGY STUDY: SIGNIFICANT CHANGE UP

## 2021-01-27 NOTE — DISCHARGE NOTE NURSING/CASE MANAGEMENT/SOCIAL WORK - NSDCPEFALRISK_GEN_ALL_CORE
Faxed signed forms to [facility], [phone], right fax confirmed at 1:32 pm today, 1/27/2021.  Copy to TC and abstracting.  Brittany Diallo [facility]  2nd Floor  Primary Care     Patient information on fall and injury prevention

## 2021-07-15 NOTE — PATIENT PROFILE ADULT - DISASTER - FALL HARM RISK TYPE OF ASSESSMENT
"The patient is Stable - Low risk of patient condition declining or worsening    Shift Goals  Patient Goals: pain control    Problem: Pain - Standard  Goal: Alleviation of pain or a reduction in pain to the patient’s comfort goal  7/15/2021 0424 by Razia Multani R.N.  Note: Medicated X 2 with Oxycodone 10 for C/O back pain, patient verbalized \" relief \" during reassessment. Sleeping comfortably during rounds.       Problem: Infection  Goal: Patient will remain free from infection  Outcome: Progressing  Note: Afebrile. Continue with PO-ABT for Pneumonitis with no S/E noted. IS encouraged.        " admission

## 2021-09-03 ENCOUNTER — APPOINTMENT (OUTPATIENT)
Dept: UROLOGY | Facility: CLINIC | Age: 66
End: 2021-09-03

## 2021-11-24 NOTE — ED PROVIDER NOTE - OBJECTIVE STATEMENT
63yo male with a PMH of htn and prediabetes presents with c/o generalized weakness, headache, fevers to 104, cough, sob, nausea, generalized abdominal discomfort for 10 days.  Pt states he had a few episodes of diarrhea over the past few days.  Pt states he has been taking tylenol and theraflu at home.  Last took tylenol last night. Pt c/o bilateral anterior chest pain and upper bilateral back pain, 7/10, worse with deep breaths.  Denies sick contacts. Returned from [place] on March 10, states he started having mild symptoms 3 days after arrival to [place].  O2 sat 92 RA 63yo male with a PMH of htn and prediabetes presents with c/o generalized weakness, headache, fevers to 104, cough, sob, nausea, generalized abdominal discomfort for 10 days.  Pt states he had a few episodes of diarrhea over the past few days.  Pt states he has been taking tylenol and theraflu at home.  Last took tylenol last night. Pt c/o bilateral anterior chest pain and upper bilateral back pain, 7/10, worse with deep breaths.  Denies sick contacts. Returned from [place] on March 10, states he started having mild symptoms 3 days after arrival to [place].  O2 sat at rest 92% RA, ambulatory O2 sat 88% RA EKG/Imaging Studies

## 2023-06-26 NOTE — PROGRESS NOTE ADULT - SUBJECTIVE AND OBJECTIVE BOX
[facility] Division of Hospital Medicine Progress Note    64 yom hx HTN admitted 4/2 w/ 10 days fevers, myalgias, exertional SOB for 2 days, COVID+ 4/2  He has improved incrementally day by day    MEDICATIONS  (STANDING):  carvedilol 25 milliGRAM(s) Oral every 12 hours  enoxaparin Injectable 40 milliGRAM(s) SubCutaneous daily  NIFEdipine XL 60 milliGRAM(s) Oral daily    MEDICATIONS  (PRN):  acetaminophen   Tablet .. 650 milliGRAM(s) Oral every 4 hours PRN Temp greater or equal to 38.5C (101.3F)  benzocaine 15 mG/menthol 3.6 mG (Sugar-Free) Lozenge 1 Lozenge Oral three times a day PRN Sore Throat  sodium chloride 0.65% Nasal 1 Spray(s) Both Nostrils four times a day PRN Nasal Congestion    PHYSICAL EXAM:  Vital Signs Last 24 Hrs  T(C): 36.6 (14 Apr 2020 04:44), Max: 37.2 (13 Apr 2020 21:52)  T(F): 97.9 (14 Apr 2020 04:44), Max: 98.9 (13 Apr 2020 21:52)  HR: 89 (14 Apr 2020 04:44) (82 - 90)  BP: 123/83 (14 Apr 2020 04:44) (116/85 - 135/83)  BP(mean): --  RR: 20 (14 Apr 2020 04:44) (20 - 20)  SpO2: 95% (14 Apr 2020 04:44) (91% - 97%)    gen:  CV:  resp:    LABS:                        12.6   8.98  )-----------( 344      ( 14 Apr 2020 07:04 )             37.6     04-14    132<L>  |  95<L>  |  18  ----------------------------<  182<H>  4.1   |  27  |  0.89    Ca    9.5      14 Apr 2020 06:57    TPro  7.6  /  Alb  3.3  /  TBili  0.5  /  DBili  x   /  AST  25  /  ALT  61<H>  /  AlkPhos  54  04-14    COVID-19 PCR: Detected (04-02-20 @ 13:58)    RADIOLOGY & ADDITIONAL TESTS:  Imaging from Last 24 Hours:  Electrocardiogram/QTc Interval:    COORDINATION OF CARE:  Care Discussed with Consultants/Other Providers: [facility] Division of Hospital Medicine Progress Note    64 yom hx HTN admitted 4/2 w/ 10 days fevers, myalgias, exertional SOB for 2 days, COVID+ 4/2  He has improved incrementally day by day    MEDICATIONS  (STANDING):  carvedilol 25 milliGRAM(s) Oral every 12 hours  enoxaparin Injectable 40 milliGRAM(s) SubCutaneous daily  NIFEdipine XL 60 milliGRAM(s) Oral daily    MEDICATIONS  (PRN):  acetaminophen   Tablet .. 650 milliGRAM(s) Oral every 4 hours PRN Temp greater or equal to 38.5C (101.3F)  benzocaine 15 mG/menthol 3.6 mG (Sugar-Free) Lozenge 1 Lozenge Oral three times a day PRN Sore Throat  sodium chloride 0.65% Nasal 1 Spray(s) Both Nostrils four times a day PRN Nasal Congestion    PHYSICAL EXAM:  Vital Signs Last 24 Hrs  T(C): 36.6 (14 Apr 2020 04:44), Max: 37.2 (13 Apr 2020 21:52)  T(F): 97.9 (14 Apr 2020 04:44), Max: 98.9 (13 Apr 2020 21:52)  HR: 89 (14 Apr 2020 04:44) (82 - 90)  BP: 123/83 (14 Apr 2020 04:44) (116/85 - 135/83)  BP(mean): --  RR: 20 (14 Apr 2020 04:44) (20 - 20)  SpO2: 95% (14 Apr 2020 04:44) (91% - 97%)    gen: no distress  CV: regular, normal rate  resp: very mild conversational SOB; rales bilatearlly; very poor air entry    LABS:                        12.6   8.98  )-----------( 344      ( 14 Apr 2020 07:04 )             37.6     04-14    132<L>  |  95<L>  |  18  ----------------------------<  182<H>  4.1   |  27  |  0.89    Ca    9.5      14 Apr 2020 06:57    TPro  7.6  /  Alb  3.3  /  TBili  0.5  /  DBili  x   /  AST  25  /  ALT  61<H>  /  AlkPhos  54  04-14    COVID-19 PCR: Detected (04-02-20 @ 13:58)    RADIOLOGY & ADDITIONAL TESTS:  Imaging from Last 24 Hours:  Electrocardiogram/QTc Interval:    COORDINATION OF CARE:  Care Discussed with Consultants/Other Providers: No

## 2023-07-12 NOTE — PROGRESS NOTE ADULT - SUBJECTIVE AND OBJECTIVE BOX
Authored by Dr. Spears, pager number [phone]. After 7pm, page [phone].    Patient is a 64y old  Male who presents with a chief complaint of hypoxia.      SUBJECTIVE / OVERNIGHT EVENTS:  No acute events overnight. Patient breathing well on room air at rest but is anxious regarding his dyspnea on ambulation. Discussed continuing blood thinner and new blood pressure medicine on discharge.    MEDICATIONS  (STANDING):  carvedilol 25 milliGRAM(s) Oral every 12 hours  enoxaparin Injectable 40 milliGRAM(s) SubCutaneous daily  NIFEdipine XL 30 milliGRAM(s) Oral daily  polyethylene glycol 3350 17 Gram(s) Oral daily    MEDICATIONS  (PRN):  acetaminophen   Tablet .. 650 milliGRAM(s) Oral every 4 hours PRN Temp greater or equal to 38.5C (101.3F)  benzocaine 15 mG/menthol 3.6 mG (Sugar-Free) Lozenge 1 Lozenge Oral three times a day PRN Sore Throat  sodium chloride 0.65% Nasal 1 Spray(s) Both Nostrils four times a day PRN Nasal Congestion      Vital Signs Last 24 Hrs  T(C): 36.7 (21 Apr 2020 04:31), Max: 37.1 (20 Apr 2020 16:08)  T(F): 98.1 (21 Apr 2020 04:31), Max: 98.7 (20 Apr 2020 16:08)  HR: 81 (21 Apr 2020 04:31) (81 - 87)  BP: 128/72 (21 Apr 2020 04:31) (118/76 - 141/82)  BP(mean): --  RR: 18 (21 Apr 2020 04:31) (18 - 18)  SpO2: 93% (21 Apr 2020 04:31) (93% - 98%)  CAPILLARY BLOOD GLUCOSE        I&O's Summary    20 Apr 2020 07:01  -  21 Apr 2020 07:00  --------------------------------------------------------  IN: 480 mL / OUT: 0 mL / NET: 480 mL    21 Apr 2020 07:01  -  21 Apr 2020 09:18  --------------------------------------------------------  IN: 240 mL / OUT: 300 mL / NET: -60 mL        PHYSICAL EXAM:  GENERAL: NAD, well-developed  EYES: EOMI, PERRLA, conjunctiva and sclera clear  CHEST/LUNG: Clear to auscultation bilaterally; No wheeze  HEART: Regular rate and rhythm; No murmurs, rubs, or gallops  ABDOMEN: Soft, Nontender, Nondistended; Bowel sounds present  EXTREMITIES:  2+ Peripheral Pulses, No clubbing, cyanosis, or edema  PSYCH: AAOx3  NEUROLOGY: non-focal  SKIN: No rashes or lesions    LABS:                          RADIOLOGY & ADDITIONAL TESTS:    Imaging Personally Reviewed:    Consultant(s) Notes Reviewed:      Care Discussed with Consultants/Other Providers: Show Inventory Tab: Hide

## 2023-09-13 NOTE — PROGRESS NOTE ADULT - PROBLEM/PLAN-4
DISPLAY PLAN FREE TEXT
NAUSEA/PAIN/VOMITING

## 2024-03-15 NOTE — PHYSICAL EXAM
[General Appearance - Well Nourished] : well nourished [General Appearance - Well Developed] : well developed [Sclera] : the sclera and conjunctiva were normal [Examination Of The Oral Cavity] : the lips and gums were normal [Oropharynx] : the oropharynx was normal [Auscultation Breath Sounds / Voice Sounds] : lungs were clear to auscultation bilaterally [Heart Sounds] : normal S1 and S2 [Heart Sounds Gallop] : no gallops [Murmurs] : no murmurs [Heart Sounds Pericardial Friction Rub] : no pericardial rub [Abdomen Soft] : soft [Abdomen Tenderness] : non-tender [Cervical Lymph Nodes Enlarged Posterior Bilaterally] : posterior cervical [Cervical Lymph Nodes Enlarged Anterior Bilaterally] : anterior cervical [Axillary Lymph Nodes Enlarged Bilaterally] : axillary [] : no rash [Skin Lesions] : no skin lesions [FreeTextEntry1] : strength 5/5 X 4 extremeties [Oriented To Time, Place, And Person] : oriented to person, place, and time Stable